# Patient Record
Sex: MALE | Race: BLACK OR AFRICAN AMERICAN | NOT HISPANIC OR LATINO | Employment: OTHER | URBAN - METROPOLITAN AREA
[De-identification: names, ages, dates, MRNs, and addresses within clinical notes are randomized per-mention and may not be internally consistent; named-entity substitution may affect disease eponyms.]

---

## 2020-01-01 ENCOUNTER — HOSPITAL ENCOUNTER (INPATIENT)
Facility: HOSPITAL | Age: 70
LOS: 5 days | DRG: 193 | End: 2020-08-12
Attending: PSYCHIATRY & NEUROLOGY | Admitting: PSYCHIATRY & NEUROLOGY
Payer: MEDICARE

## 2020-01-01 ENCOUNTER — ANESTHESIA EVENT (OUTPATIENT)
Dept: NEUROSURGERY | Facility: HOSPITAL | Age: 70
DRG: 193 | End: 2020-01-01
Payer: MEDICARE

## 2020-01-01 ENCOUNTER — ANESTHESIA (OUTPATIENT)
Dept: NEUROSURGERY | Facility: HOSPITAL | Age: 70
DRG: 193 | End: 2020-01-01
Payer: MEDICARE

## 2020-01-01 VITALS
BODY MASS INDEX: 41.69 KG/M2 | RESPIRATION RATE: 28 BRPM | SYSTOLIC BLOOD PRESSURE: 140 MMHG | OXYGEN SATURATION: 90 % | HEART RATE: 72 BPM | TEMPERATURE: 98 F | DIASTOLIC BLOOD PRESSURE: 85 MMHG | HEIGHT: 69 IN | WEIGHT: 281.5 LBS

## 2020-01-01 DIAGNOSIS — R41.89 UNRESPONSIVE: ICD-10-CM

## 2020-01-01 DIAGNOSIS — I48.20 CHRONIC ATRIAL FIBRILLATION: ICD-10-CM

## 2020-01-01 DIAGNOSIS — J15.9 COMMUNITY ACQUIRED BACTERIAL PNEUMONIA: ICD-10-CM

## 2020-01-01 DIAGNOSIS — G45.9 TIA (TRANSIENT ISCHEMIC ATTACK): ICD-10-CM

## 2020-01-01 DIAGNOSIS — I49.8 BIGEMINY: ICD-10-CM

## 2020-01-01 DIAGNOSIS — R41.82 ALTERED MENTAL STATE: ICD-10-CM

## 2020-01-01 LAB
ABO + RH BLD: NORMAL
ALBUMIN SERPL BCP-MCNC: 1.9 G/DL (ref 3.5–5.2)
ALBUMIN SERPL BCP-MCNC: 2 G/DL (ref 3.5–5.2)
ALBUMIN SERPL BCP-MCNC: 2.2 G/DL (ref 3.5–5.2)
ALBUMIN SERPL BCP-MCNC: 2.5 G/DL (ref 3.5–5.2)
ALBUMIN SERPL BCP-MCNC: 2.6 G/DL (ref 3.5–5.2)
ALLENS TEST: ABNORMAL
ALLENS TEST: ABNORMAL
ALP SERPL-CCNC: 111 U/L (ref 55–135)
ALP SERPL-CCNC: 201 U/L (ref 55–135)
ALP SERPL-CCNC: 89 U/L (ref 55–135)
ALP SERPL-CCNC: 95 U/L (ref 55–135)
ALP SERPL-CCNC: 95 U/L (ref 55–135)
ALT SERPL W/O P-5'-P-CCNC: 181 U/L (ref 10–44)
ALT SERPL W/O P-5'-P-CCNC: 25 U/L (ref 10–44)
ALT SERPL W/O P-5'-P-CCNC: 26 U/L (ref 10–44)
ALT SERPL W/O P-5'-P-CCNC: 29 U/L (ref 10–44)
ALT SERPL W/O P-5'-P-CCNC: 54 U/L (ref 10–44)
AMYLASE SERPL-CCNC: 75 U/L (ref 20–110)
ANION GAP SERPL CALC-SCNC: 11 MMOL/L (ref 8–16)
ANION GAP SERPL CALC-SCNC: 11 MMOL/L (ref 8–16)
ANION GAP SERPL CALC-SCNC: 12 MMOL/L (ref 8–16)
ANION GAP SERPL CALC-SCNC: 13 MMOL/L (ref 8–16)
ANION GAP SERPL CALC-SCNC: 15 MMOL/L (ref 8–16)
ANION GAP SERPL CALC-SCNC: 9 MMOL/L (ref 8–16)
APTT BLDCRRT: 34.2 SEC (ref 21–32)
APTT BLDCRRT: 34.2 SEC (ref 21–32)
APTT BLDCRRT: 34.5 SEC (ref 21–32)
APTT BLDCRRT: 41.2 SEC (ref 21–32)
APTT BLDCRRT: 50.1 SEC (ref 21–32)
ASCENDING AORTA: 2.53 CM
AST SERPL-CCNC: 25 U/L (ref 10–40)
AST SERPL-CCNC: 27 U/L (ref 10–40)
AST SERPL-CCNC: 31 U/L (ref 10–40)
AST SERPL-CCNC: 312 U/L (ref 10–40)
AST SERPL-CCNC: 77 U/L (ref 10–40)
AV INDEX (PROSTH): 0.37
AV MEAN GRADIENT: 7 MMHG
AV PEAK GRADIENT: 12 MMHG
AV VALVE AREA: 1.16 CM2
AV VELOCITY RATIO: 0.4
BACTERIA #/AREA URNS AUTO: ABNORMAL /HPF
BACTERIA #/AREA URNS AUTO: ABNORMAL /HPF
BACTERIA SPEC AEROBE CULT: NORMAL
BACTERIA SPEC AEROBE CULT: NORMAL
BASOPHILS # BLD AUTO: 0.01 K/UL (ref 0–0.2)
BASOPHILS # BLD AUTO: 0.02 K/UL (ref 0–0.2)
BASOPHILS NFR BLD: 0.1 % (ref 0–1.9)
BILIRUB SERPL-MCNC: 0.7 MG/DL (ref 0.1–1)
BILIRUB SERPL-MCNC: 0.7 MG/DL (ref 0.1–1)
BILIRUB SERPL-MCNC: 0.9 MG/DL (ref 0.1–1)
BILIRUB SERPL-MCNC: 0.9 MG/DL (ref 0.1–1)
BILIRUB SERPL-MCNC: 1 MG/DL (ref 0.1–1)
BILIRUB UR QL STRIP: NEGATIVE
BLD GP AB SCN CELLS X3 SERPL QL: NORMAL
BNP SERPL-MCNC: 440 PG/ML (ref 0–99)
BNP SERPL-MCNC: 691 PG/ML (ref 0–99)
BSA FOR ECHO PROCEDURE: 2.43 M2
BUN SERPL-MCNC: 58 MG/DL (ref 8–23)
BUN SERPL-MCNC: 62 MG/DL (ref 8–23)
BUN SERPL-MCNC: 68 MG/DL (ref 8–23)
BUN SERPL-MCNC: 78 MG/DL (ref 8–23)
BUN SERPL-MCNC: 82 MG/DL (ref 8–23)
BUN SERPL-MCNC: 95 MG/DL (ref 8–23)
CALCIUM SERPL-MCNC: 7.9 MG/DL (ref 8.7–10.5)
CALCIUM SERPL-MCNC: 8.2 MG/DL (ref 8.7–10.5)
CALCIUM SERPL-MCNC: 8.5 MG/DL (ref 8.7–10.5)
CALCIUM SERPL-MCNC: 8.7 MG/DL (ref 8.7–10.5)
CALCIUM SERPL-MCNC: 8.7 MG/DL (ref 8.7–10.5)
CALCIUM SERPL-MCNC: 8.8 MG/DL (ref 8.7–10.5)
CHLORIDE SERPL-SCNC: 100 MMOL/L (ref 95–110)
CHLORIDE SERPL-SCNC: 101 MMOL/L (ref 95–110)
CHLORIDE SERPL-SCNC: 101 MMOL/L (ref 95–110)
CHLORIDE SERPL-SCNC: 102 MMOL/L (ref 95–110)
CHLORIDE SERPL-SCNC: 103 MMOL/L (ref 95–110)
CHLORIDE SERPL-SCNC: 99 MMOL/L (ref 95–110)
CHOLEST SERPL-MCNC: 89 MG/DL (ref 120–199)
CHOLEST/HDLC SERPL: 3.6 {RATIO} (ref 2–5)
CLARITY UR REFRACT.AUTO: ABNORMAL
CO2 SERPL-SCNC: 22 MMOL/L (ref 23–29)
CO2 SERPL-SCNC: 25 MMOL/L (ref 23–29)
CO2 SERPL-SCNC: 26 MMOL/L (ref 23–29)
CO2 SERPL-SCNC: 28 MMOL/L (ref 23–29)
COLOR UR AUTO: YELLOW
CREAT SERPL-MCNC: 2.1 MG/DL (ref 0.5–1.4)
CREAT SERPL-MCNC: 2.1 MG/DL (ref 0.5–1.4)
CREAT SERPL-MCNC: 2.4 MG/DL (ref 0.5–1.4)
CREAT SERPL-MCNC: 3.1 MG/DL (ref 0.5–1.4)
CREAT SERPL-MCNC: 3.1 MG/DL (ref 0.5–1.4)
CREAT SERPL-MCNC: 3.4 MG/DL (ref 0.5–1.4)
CREAT UR-MCNC: 87 MG/DL (ref 23–375)
CV ECHO LV RWT: 0.31 CM
D DIMER PPP IA.FEU-MCNC: 0.93 MG/L FEU
DELSYS: ABNORMAL
DELSYS: ABNORMAL
DIFFERENTIAL METHOD: ABNORMAL
DOP CALC AO PEAK VEL: 1.72 M/S
DOP CALC AO VTI: 38.39 CM
DOP CALC LVOT AREA: 3.1 CM2
DOP CALC LVOT DIAMETER: 1.99 CM
DOP CALC LVOT PEAK VEL: 0.68 M/S
DOP CALC LVOT STROKE VOLUME: 44.49 CM3
DOP CALCLVOT PEAK VEL VTI: 14.31 CM
E WAVE DECELERATION TIME: 224.66 MSEC
E/A RATIO: 3.52
E/E' RATIO: 12 M/S
ECHO LV POSTERIOR WALL: 0.8 CM (ref 0.6–1.1)
EOSINOPHIL # BLD AUTO: 0 K/UL (ref 0–0.5)
EOSINOPHIL NFR BLD: 0 % (ref 0–8)
EOSINOPHIL NFR BLD: 0.1 % (ref 0–8)
EOSINOPHIL NFR BLD: 0.1 % (ref 0–8)
ERYTHROCYTE [DISTWIDTH] IN BLOOD BY AUTOMATED COUNT: 14.7 % (ref 11.5–14.5)
ERYTHROCYTE [DISTWIDTH] IN BLOOD BY AUTOMATED COUNT: 15.3 % (ref 11.5–14.5)
ERYTHROCYTE [DISTWIDTH] IN BLOOD BY AUTOMATED COUNT: 15.3 % (ref 11.5–14.5)
ERYTHROCYTE [DISTWIDTH] IN BLOOD BY AUTOMATED COUNT: 15.4 % (ref 11.5–14.5)
EST. GFR  (AFRICAN AMERICAN): 20.1 ML/MIN/1.73 M^2
EST. GFR  (AFRICAN AMERICAN): 22.5 ML/MIN/1.73 M^2
EST. GFR  (AFRICAN AMERICAN): 22.5 ML/MIN/1.73 M^2
EST. GFR  (AFRICAN AMERICAN): 30.7 ML/MIN/1.73 M^2
EST. GFR  (AFRICAN AMERICAN): 36 ML/MIN/1.73 M^2
EST. GFR  (AFRICAN AMERICAN): 36 ML/MIN/1.73 M^2
EST. GFR  (NON AFRICAN AMERICAN): 17.4 ML/MIN/1.73 M^2
EST. GFR  (NON AFRICAN AMERICAN): 19.5 ML/MIN/1.73 M^2
EST. GFR  (NON AFRICAN AMERICAN): 19.5 ML/MIN/1.73 M^2
EST. GFR  (NON AFRICAN AMERICAN): 26.5 ML/MIN/1.73 M^2
EST. GFR  (NON AFRICAN AMERICAN): 31.2 ML/MIN/1.73 M^2
EST. GFR  (NON AFRICAN AMERICAN): 31.2 ML/MIN/1.73 M^2
ESTIMATED AVG GLUCOSE: 180 MG/DL (ref 68–131)
FIO2: 35
FLOW: 2
FRACTIONAL SHORTENING: 35 % (ref 28–44)
GLUCOSE SERPL-MCNC: 102 MG/DL (ref 70–110)
GLUCOSE SERPL-MCNC: 106 MG/DL (ref 70–110)
GLUCOSE SERPL-MCNC: 122 MG/DL (ref 70–110)
GLUCOSE SERPL-MCNC: 195 MG/DL (ref 70–110)
GLUCOSE SERPL-MCNC: 229 MG/DL (ref 70–110)
GLUCOSE SERPL-MCNC: 337 MG/DL (ref 70–110)
GLUCOSE UR QL STRIP: ABNORMAL
GLUCOSE UR QL STRIP: ABNORMAL
GLUCOSE UR QL STRIP: NEGATIVE
GRAM STN SPEC: NORMAL
HAV IGM SERPL QL IA: NEGATIVE
HBA1C MFR BLD HPLC: 7.9 % (ref 4–5.6)
HBV CORE IGM SERPL QL IA: NEGATIVE
HBV SURFACE AG SERPL QL IA: NEGATIVE
HCO3 UR-SCNC: 26.5 MMOL/L (ref 24–28)
HCO3 UR-SCNC: 28.1 MMOL/L (ref 24–28)
HCT VFR BLD AUTO: 28.4 % (ref 40–54)
HCT VFR BLD AUTO: 30.4 % (ref 40–54)
HCT VFR BLD AUTO: 30.5 % (ref 40–54)
HCT VFR BLD AUTO: 32.1 % (ref 40–54)
HCT VFR BLD AUTO: 32.1 % (ref 40–54)
HCT VFR BLD AUTO: 33.5 % (ref 40–54)
HCV AB SERPL QL IA: NEGATIVE
HDLC SERPL-MCNC: 25 MG/DL (ref 40–75)
HDLC SERPL: 28.1 % (ref 20–50)
HGB BLD-MCNC: 10.2 G/DL (ref 14–18)
HGB BLD-MCNC: 10.2 G/DL (ref 14–18)
HGB BLD-MCNC: 10.6 G/DL (ref 14–18)
HGB BLD-MCNC: 9.1 G/DL (ref 14–18)
HGB BLD-MCNC: 9.7 G/DL (ref 14–18)
HGB BLD-MCNC: 9.8 G/DL (ref 14–18)
HGB UR QL STRIP: ABNORMAL
HYALINE CASTS UR QL AUTO: 2 /LPF
HYALINE CASTS UR QL AUTO: 3 /LPF
IMM GRANULOCYTES # BLD AUTO: 0.05 K/UL (ref 0–0.04)
IMM GRANULOCYTES # BLD AUTO: 0.05 K/UL (ref 0–0.04)
IMM GRANULOCYTES # BLD AUTO: 0.08 K/UL (ref 0–0.04)
IMM GRANULOCYTES # BLD AUTO: 0.12 K/UL (ref 0–0.04)
IMM GRANULOCYTES # BLD AUTO: 0.16 K/UL (ref 0–0.04)
IMM GRANULOCYTES # BLD AUTO: 0.73 K/UL (ref 0–0.04)
IMM GRANULOCYTES NFR BLD AUTO: 0.4 % (ref 0–0.5)
IMM GRANULOCYTES NFR BLD AUTO: 0.4 % (ref 0–0.5)
IMM GRANULOCYTES NFR BLD AUTO: 0.6 % (ref 0–0.5)
IMM GRANULOCYTES NFR BLD AUTO: 0.6 % (ref 0–0.5)
IMM GRANULOCYTES NFR BLD AUTO: 0.8 % (ref 0–0.5)
IMM GRANULOCYTES NFR BLD AUTO: 3.2 % (ref 0–0.5)
INR PPP: 1.1 (ref 0.8–1.2)
INTERVENTRICULAR SEPTUM: 0.9 CM (ref 0.6–1.1)
KETONES UR QL STRIP: NEGATIVE
LA MAJOR: 6.99 CM
LA MINOR: 5.57 CM
LA WIDTH: 4.93 CM
LDLC SERPL CALC-MCNC: 50.6 MG/DL (ref 63–159)
LEFT ATRIUM SIZE: 4.42 CM
LEFT ATRIUM VOLUME INDEX: 49.1 ML/M2
LEFT ATRIUM VOLUME: 114.83 CM3
LEFT INTERNAL DIMENSION IN SYSTOLE: 3.39 CM (ref 2.1–4)
LEFT VENTRICLE DIASTOLIC VOLUME INDEX: 41.39 ML/M2
LEFT VENTRICLE DIASTOLIC VOLUME: 96.81 ML
LEFT VENTRICLE MASS INDEX: 67 G/M2
LEFT VENTRICLE SYSTOLIC VOLUME INDEX: 20.2 ML/M2
LEFT VENTRICLE SYSTOLIC VOLUME: 47.2 ML
LEFT VENTRICULAR INTERNAL DIMENSION IN DIASTOLE: 5.2 CM (ref 3.5–6)
LEFT VENTRICULAR MASS: 156.93 G
LEUKOCYTE ESTERASE UR QL STRIP: NEGATIVE
LIPASE SERPL-CCNC: 15 U/L (ref 4–60)
LV LATERAL E/E' RATIO: 9.27 M/S
LV SEPTAL E/E' RATIO: 17 M/S
LYMPHOCYTES # BLD AUTO: 0.3 K/UL (ref 1–4.8)
LYMPHOCYTES # BLD AUTO: 0.6 K/UL (ref 1–4.8)
LYMPHOCYTES # BLD AUTO: 0.7 K/UL (ref 1–4.8)
LYMPHOCYTES # BLD AUTO: 0.7 K/UL (ref 1–4.8)
LYMPHOCYTES NFR BLD: 2.3 % (ref 18–48)
LYMPHOCYTES NFR BLD: 2.9 % (ref 18–48)
LYMPHOCYTES NFR BLD: 2.9 % (ref 18–48)
LYMPHOCYTES NFR BLD: 3 % (ref 18–48)
LYMPHOCYTES NFR BLD: 3.2 % (ref 18–48)
LYMPHOCYTES NFR BLD: 3.7 % (ref 18–48)
MAGNESIUM SERPL-MCNC: 2.5 MG/DL (ref 1.6–2.6)
MAGNESIUM SERPL-MCNC: 2.8 MG/DL (ref 1.6–2.6)
MAGNESIUM SERPL-MCNC: 2.8 MG/DL (ref 1.6–2.6)
MAGNESIUM SERPL-MCNC: 3 MG/DL (ref 1.6–2.6)
MAGNESIUM SERPL-MCNC: 3.6 MG/DL (ref 1.6–2.6)
MCH RBC QN AUTO: 26.4 PG (ref 27–31)
MCH RBC QN AUTO: 26.8 PG (ref 27–31)
MCH RBC QN AUTO: 26.8 PG (ref 27–31)
MCH RBC QN AUTO: 26.9 PG (ref 27–31)
MCH RBC QN AUTO: 27 PG (ref 27–31)
MCH RBC QN AUTO: 27 PG (ref 27–31)
MCHC RBC AUTO-ENTMCNC: 31.6 G/DL (ref 32–36)
MCHC RBC AUTO-ENTMCNC: 31.8 G/DL (ref 32–36)
MCHC RBC AUTO-ENTMCNC: 32 G/DL (ref 32–36)
MCHC RBC AUTO-ENTMCNC: 32.2 G/DL (ref 32–36)
MCV RBC AUTO: 83 FL (ref 82–98)
MCV RBC AUTO: 84 FL (ref 82–98)
MCV RBC AUTO: 84 FL (ref 82–98)
MCV RBC AUTO: 85 FL (ref 82–98)
MICROSCOPIC COMMENT: ABNORMAL
MICROSCOPIC COMMENT: ABNORMAL
MIN VOL: 11.5
MODE: ABNORMAL
MODE: ABNORMAL
MONOCYTES # BLD AUTO: 0.2 K/UL (ref 0.3–1)
MONOCYTES # BLD AUTO: 0.3 K/UL (ref 0.3–1)
MONOCYTES # BLD AUTO: 0.3 K/UL (ref 0.3–1)
MONOCYTES # BLD AUTO: 1.6 K/UL (ref 0.3–1)
MONOCYTES # BLD AUTO: 1.6 K/UL (ref 0.3–1)
MONOCYTES # BLD AUTO: 1.7 K/UL (ref 0.3–1)
MONOCYTES NFR BLD: 1.5 % (ref 4–15)
MONOCYTES NFR BLD: 2.2 % (ref 4–15)
MONOCYTES NFR BLD: 2.2 % (ref 4–15)
MONOCYTES NFR BLD: 7.5 % (ref 4–15)
MONOCYTES NFR BLD: 8.1 % (ref 4–15)
MONOCYTES NFR BLD: 8.3 % (ref 4–15)
MV PEAK A VEL: 0.29 M/S
MV PEAK E VEL: 1.02 M/S
MV STENOSIS PRESSURE HALF TIME: 65.15 MS
MV VALVE AREA P 1/2 METHOD: 3.38 CM2
NEUTROPHILS # BLD AUTO: 11.1 K/UL (ref 1.8–7.7)
NEUTROPHILS # BLD AUTO: 11.1 K/UL (ref 1.8–7.7)
NEUTROPHILS # BLD AUTO: 13.8 K/UL (ref 1.8–7.7)
NEUTROPHILS # BLD AUTO: 17.1 K/UL (ref 1.8–7.7)
NEUTROPHILS # BLD AUTO: 17.3 K/UL (ref 1.8–7.7)
NEUTROPHILS # BLD AUTO: 19.5 K/UL (ref 1.8–7.7)
NEUTROPHILS NFR BLD: 86 % (ref 38–73)
NEUTROPHILS NFR BLD: 87.2 % (ref 38–73)
NEUTROPHILS NFR BLD: 88 % (ref 38–73)
NEUTROPHILS NFR BLD: 94.4 % (ref 38–73)
NEUTROPHILS NFR BLD: 94.4 % (ref 38–73)
NEUTROPHILS NFR BLD: 95.4 % (ref 38–73)
NITRITE UR QL STRIP: NEGATIVE
NONHDLC SERPL-MCNC: 64 MG/DL
NRBC BLD-RTO: 0 /100 WBC
PCO2 BLDA: 42.2 MMHG (ref 35–45)
PCO2 BLDA: 42.9 MMHG (ref 35–45)
PEEP: 10
PH SMN: 7.4 [PH] (ref 7.35–7.45)
PH SMN: 7.43 [PH] (ref 7.35–7.45)
PH UR STRIP: 5 [PH] (ref 5–8)
PHOSPHATE SERPL-MCNC: 3.3 MG/DL (ref 2.7–4.5)
PHOSPHATE SERPL-MCNC: 3.9 MG/DL (ref 2.7–4.5)
PHOSPHATE SERPL-MCNC: 4.4 MG/DL (ref 2.7–4.5)
PHOSPHATE SERPL-MCNC: 4.6 MG/DL (ref 2.7–4.5)
PHOSPHATE SERPL-MCNC: 5.9 MG/DL (ref 2.7–4.5)
PISA TR MAX VEL: 4 M/S
PLATELET # BLD AUTO: 152 K/UL (ref 150–350)
PLATELET # BLD AUTO: 152 K/UL (ref 150–350)
PLATELET # BLD AUTO: 182 K/UL (ref 150–350)
PLATELET # BLD AUTO: 187 K/UL (ref 150–350)
PLATELET # BLD AUTO: 191 K/UL (ref 150–350)
PLATELET # BLD AUTO: 200 K/UL (ref 150–350)
PMV BLD AUTO: 10.8 FL (ref 9.2–12.9)
PMV BLD AUTO: 10.9 FL (ref 9.2–12.9)
PMV BLD AUTO: 10.9 FL (ref 9.2–12.9)
PMV BLD AUTO: 11 FL (ref 9.2–12.9)
PMV BLD AUTO: 11.3 FL (ref 9.2–12.9)
PMV BLD AUTO: 11.3 FL (ref 9.2–12.9)
PO2 BLDA: 68 MMHG (ref 40–60)
PO2 BLDA: 74 MMHG (ref 80–100)
POC BE: 2 MMOL/L
POC BE: 4 MMOL/L
POC SATURATED O2: 93 % (ref 95–100)
POC SATURATED O2: 95 % (ref 95–100)
POC TCO2: 28 MMOL/L (ref 24–29)
POC TCO2: 29 MMOL/L (ref 23–27)
POCT GLUCOSE: 103 MG/DL (ref 70–110)
POCT GLUCOSE: 126 MG/DL (ref 70–110)
POCT GLUCOSE: 140 MG/DL (ref 70–110)
POCT GLUCOSE: 147 MG/DL (ref 70–110)
POCT GLUCOSE: 149 MG/DL (ref 70–110)
POCT GLUCOSE: 155 MG/DL (ref 70–110)
POCT GLUCOSE: 174 MG/DL (ref 70–110)
POCT GLUCOSE: 210 MG/DL (ref 70–110)
POCT GLUCOSE: 213 MG/DL (ref 70–110)
POCT GLUCOSE: 220 MG/DL (ref 70–110)
POCT GLUCOSE: 224 MG/DL (ref 70–110)
POCT GLUCOSE: 244 MG/DL (ref 70–110)
POCT GLUCOSE: 266 MG/DL (ref 70–110)
POCT GLUCOSE: 332 MG/DL (ref 70–110)
POCT GLUCOSE: 370 MG/DL (ref 70–110)
POCT GLUCOSE: 396 MG/DL (ref 70–110)
POCT GLUCOSE: 87 MG/DL (ref 70–110)
POTASSIUM SERPL-SCNC: 3.8 MMOL/L (ref 3.5–5.1)
POTASSIUM SERPL-SCNC: 4.1 MMOL/L (ref 3.5–5.1)
POTASSIUM SERPL-SCNC: 4.2 MMOL/L (ref 3.5–5.1)
POTASSIUM SERPL-SCNC: 4.6 MMOL/L (ref 3.5–5.1)
POTASSIUM SERPL-SCNC: 4.6 MMOL/L (ref 3.5–5.1)
POTASSIUM SERPL-SCNC: 4.9 MMOL/L (ref 3.5–5.1)
PROT SERPL-MCNC: 6.4 G/DL (ref 6–8.4)
PROT SERPL-MCNC: 6.5 G/DL (ref 6–8.4)
PROT SERPL-MCNC: 6.6 G/DL (ref 6–8.4)
PROT SERPL-MCNC: 6.9 G/DL (ref 6–8.4)
PROT SERPL-MCNC: 7.1 G/DL (ref 6–8.4)
PROT UR QL STRIP: ABNORMAL
PROT UR-MCNC: 77 MG/DL (ref 0–15)
PROT/CREAT UR: 0.89 MG/G{CREAT} (ref 0–0.2)
PROTHROMBIN TIME: 12.2 SEC (ref 9–12.5)
RA MAJOR: 7.33 CM
RA WIDTH: 3.64 CM
RBC # BLD AUTO: 3.39 M/UL (ref 4.6–6.2)
RBC # BLD AUTO: 3.64 M/UL (ref 4.6–6.2)
RBC # BLD AUTO: 3.67 M/UL (ref 4.6–6.2)
RBC # BLD AUTO: 3.78 M/UL (ref 4.6–6.2)
RBC # BLD AUTO: 3.78 M/UL (ref 4.6–6.2)
RBC # BLD AUTO: 3.95 M/UL (ref 4.6–6.2)
RBC #/AREA URNS AUTO: >100 /HPF (ref 0–4)
RBC #/AREA URNS AUTO: >100 /HPF (ref 0–4)
RIGHT VENTRICULAR END-DIASTOLIC DIMENSION: 3.84 CM
SAMPLE: ABNORMAL
SAMPLE: ABNORMAL
SARS-COV-2 RDRP RESP QL NAA+PROBE: NEGATIVE
SINUS: 2.35 CM
SITE: ABNORMAL
SITE: ABNORMAL
SODIUM SERPL-SCNC: 137 MMOL/L (ref 136–145)
SODIUM SERPL-SCNC: 137 MMOL/L (ref 136–145)
SODIUM SERPL-SCNC: 138 MMOL/L (ref 136–145)
SODIUM SERPL-SCNC: 139 MMOL/L (ref 136–145)
SODIUM SERPL-SCNC: 139 MMOL/L (ref 136–145)
SODIUM SERPL-SCNC: 140 MMOL/L (ref 136–145)
SP GR UR STRIP: 1.02 (ref 1–1.03)
SP02: 93
SP02: 97
SPONT RATE: 16
SQUAMOUS #/AREA URNS AUTO: 1 /HPF
STJ: 2.82 CM
T4 FREE SERPL-MCNC: 1.11 NG/DL (ref 0.71–1.51)
TDI LATERAL: 0.11 M/S
TDI SEPTAL: 0.06 M/S
TDI: 0.09 M/S
TR MAX PG: 64 MMHG
TRIGL SERPL-MCNC: 67 MG/DL (ref 30–150)
TROPONIN I SERPL DL<=0.01 NG/ML-MCNC: 0.06 NG/ML (ref 0–0.03)
TROPONIN I SERPL DL<=0.01 NG/ML-MCNC: 0.07 NG/ML (ref 0–0.03)
TROPONIN I SERPL DL<=0.01 NG/ML-MCNC: 0.08 NG/ML (ref 0–0.03)
TSH SERPL DL<=0.005 MIU/L-ACNC: 0.37 UIU/ML (ref 0.4–4)
URN SPEC COLLECT METH UR: ABNORMAL
UUN UR-MCNC: 497 MG/DL (ref 140–1050)
VANCOMYCIN SERPL-MCNC: 13 UG/ML
VANCOMYCIN SERPL-MCNC: 19.1 UG/ML
VANCOMYCIN SERPL-MCNC: 26.7 UG/ML
VANCOMYCIN TROUGH SERPL-MCNC: 22.2 UG/ML (ref 10–22)
VIT B12 SERPL-MCNC: >2000 PG/ML (ref 210–950)
WBC # BLD AUTO: 11.76 K/UL (ref 3.9–12.7)
WBC # BLD AUTO: 11.76 K/UL (ref 3.9–12.7)
WBC # BLD AUTO: 14.43 K/UL (ref 3.9–12.7)
WBC # BLD AUTO: 19.45 K/UL (ref 3.9–12.7)
WBC # BLD AUTO: 19.8 K/UL (ref 3.9–12.7)
WBC # BLD AUTO: 22.71 K/UL (ref 3.9–12.7)
WBC #/AREA URNS AUTO: 1 /HPF (ref 0–5)
WBC #/AREA URNS AUTO: 7 /HPF (ref 0–5)
YEAST UR QL AUTO: ABNORMAL

## 2020-01-01 PROCEDURE — 25000242 PHARM REV CODE 250 ALT 637 W/ HCPCS: Performed by: NURSE PRACTITIONER

## 2020-01-01 PROCEDURE — 80074 ACUTE HEPATITIS PANEL: CPT

## 2020-01-01 PROCEDURE — 84100 ASSAY OF PHOSPHORUS: CPT

## 2020-01-01 PROCEDURE — 11000001 HC ACUTE MED/SURG PRIVATE ROOM

## 2020-01-01 PROCEDURE — 63600175 PHARM REV CODE 636 W HCPCS: Performed by: STUDENT IN AN ORGANIZED HEALTH CARE EDUCATION/TRAINING PROGRAM

## 2020-01-01 PROCEDURE — 85025 COMPLETE CBC W/AUTO DIFF WBC: CPT

## 2020-01-01 PROCEDURE — 86901 BLOOD TYPING SEROLOGIC RH(D): CPT

## 2020-01-01 PROCEDURE — 85379 FIBRIN DEGRADATION QUANT: CPT

## 2020-01-01 PROCEDURE — 31500 INSERT EMERGENCY AIRWAY: CPT | Performed by: STUDENT IN AN ORGANIZED HEALTH CARE EDUCATION/TRAINING PROGRAM

## 2020-01-01 PROCEDURE — 94640 AIRWAY INHALATION TREATMENT: CPT

## 2020-01-01 PROCEDURE — 87070 CULTURE OTHR SPECIMN AEROBIC: CPT

## 2020-01-01 PROCEDURE — 83735 ASSAY OF MAGNESIUM: CPT

## 2020-01-01 PROCEDURE — 99222 1ST HOSP IP/OBS MODERATE 55: CPT | Mod: AI,GC,, | Performed by: PSYCHIATRY & NEUROLOGY

## 2020-01-01 PROCEDURE — 92610 EVALUATE SWALLOWING FUNCTION: CPT

## 2020-01-01 PROCEDURE — 85730 THROMBOPLASTIN TIME PARTIAL: CPT

## 2020-01-01 PROCEDURE — 82803 BLOOD GASES ANY COMBINATION: CPT

## 2020-01-01 PROCEDURE — 85610 PROTHROMBIN TIME: CPT

## 2020-01-01 PROCEDURE — 99900035 HC TECH TIME PER 15 MIN (STAT)

## 2020-01-01 PROCEDURE — 83690 ASSAY OF LIPASE: CPT

## 2020-01-01 PROCEDURE — 99222 PR INITIAL HOSPITAL CARE,LEVL II: ICD-10-PCS | Mod: ,,, | Performed by: NURSE PRACTITIONER

## 2020-01-01 PROCEDURE — 99232 PR SUBSEQUENT HOSPITAL CARE,LEVL II: ICD-10-PCS | Mod: ,,, | Performed by: NURSE PRACTITIONER

## 2020-01-01 PROCEDURE — 25000003 PHARM REV CODE 250: Performed by: STUDENT IN AN ORGANIZED HEALTH CARE EDUCATION/TRAINING PROGRAM

## 2020-01-01 PROCEDURE — 25000003 PHARM REV CODE 250: Performed by: INTERNAL MEDICINE

## 2020-01-01 PROCEDURE — 25000003 PHARM REV CODE 250: Performed by: PSYCHIATRY & NEUROLOGY

## 2020-01-01 PROCEDURE — 63700000 PHARM REV CODE 250 ALT 637 W/O HCPCS: Performed by: STUDENT IN AN ORGANIZED HEALTH CARE EDUCATION/TRAINING PROGRAM

## 2020-01-01 PROCEDURE — 80061 LIPID PANEL: CPT

## 2020-01-01 PROCEDURE — 36415 COLL VENOUS BLD VENIPUNCTURE: CPT

## 2020-01-01 PROCEDURE — 84439 ASSAY OF FREE THYROXINE: CPT

## 2020-01-01 PROCEDURE — 84540 ASSAY OF URINE/UREA-N: CPT

## 2020-01-01 PROCEDURE — 83036 HEMOGLOBIN GLYCOSYLATED A1C: CPT

## 2020-01-01 PROCEDURE — 99233 SBSQ HOSP IP/OBS HIGH 50: CPT | Mod: GC,,, | Performed by: PSYCHIATRY & NEUROLOGY

## 2020-01-01 PROCEDURE — 99233 PR SUBSEQUENT HOSPITAL CARE,LEVL III: ICD-10-PCS | Mod: ,,, | Performed by: PSYCHIATRY & NEUROLOGY

## 2020-01-01 PROCEDURE — 80048 BASIC METABOLIC PNL TOTAL CA: CPT

## 2020-01-01 PROCEDURE — 80053 COMPREHEN METABOLIC PANEL: CPT

## 2020-01-01 PROCEDURE — 25000003 PHARM REV CODE 250: Performed by: UROLOGY

## 2020-01-01 PROCEDURE — 63600175 PHARM REV CODE 636 W HCPCS: Performed by: PSYCHIATRY & NEUROLOGY

## 2020-01-01 PROCEDURE — 20000000 HC ICU ROOM

## 2020-01-01 PROCEDURE — 95813 EEG EXTND MNTR 61-119 MIN: CPT

## 2020-01-01 PROCEDURE — 94761 N-INVAS EAR/PLS OXIMETRY MLT: CPT

## 2020-01-01 PROCEDURE — 63600175 PHARM REV CODE 636 W HCPCS: Performed by: UROLOGY

## 2020-01-01 PROCEDURE — 99223 1ST HOSP IP/OBS HIGH 75: CPT | Mod: ,,, | Performed by: HOSPITALIST

## 2020-01-01 PROCEDURE — 97530 THERAPEUTIC ACTIVITIES: CPT

## 2020-01-01 PROCEDURE — U0002 COVID-19 LAB TEST NON-CDC: HCPCS

## 2020-01-01 PROCEDURE — 27000221 HC OXYGEN, UP TO 24 HOURS

## 2020-01-01 PROCEDURE — 84484 ASSAY OF TROPONIN QUANT: CPT | Mod: 91

## 2020-01-01 PROCEDURE — 92523 SPEECH SOUND LANG COMPREHEN: CPT

## 2020-01-01 PROCEDURE — 25000003 PHARM REV CODE 250: Performed by: NURSE PRACTITIONER

## 2020-01-01 PROCEDURE — 25000242 PHARM REV CODE 250 ALT 637 W/ HCPCS: Performed by: STUDENT IN AN ORGANIZED HEALTH CARE EDUCATION/TRAINING PROGRAM

## 2020-01-01 PROCEDURE — 93005 ELECTROCARDIOGRAM TRACING: CPT

## 2020-01-01 PROCEDURE — 94660 CPAP INITIATION&MGMT: CPT

## 2020-01-01 PROCEDURE — 87449 NOS EACH ORGANISM AG IA: CPT

## 2020-01-01 PROCEDURE — 99232 SBSQ HOSP IP/OBS MODERATE 35: CPT | Mod: ,,, | Performed by: NURSE PRACTITIONER

## 2020-01-01 PROCEDURE — 99223 PR INITIAL HOSPITAL CARE,LEVL III: ICD-10-PCS | Mod: ,,, | Performed by: HOSPITALIST

## 2020-01-01 PROCEDURE — 85730 THROMBOPLASTIN TIME PARTIAL: CPT | Mod: 91

## 2020-01-01 PROCEDURE — 95816 PR EEG,W/AWAKE & DROWSY RECORD: ICD-10-PCS | Mod: 26,,, | Performed by: PSYCHIATRY & NEUROLOGY

## 2020-01-01 PROCEDURE — 82570 ASSAY OF URINE CREATININE: CPT

## 2020-01-01 PROCEDURE — 99238 PR HOSPITAL DISCHARGE DAY,<30 MIN: ICD-10-PCS | Mod: ,,, | Performed by: HOSPITALIST

## 2020-01-01 PROCEDURE — 27000190 HC CPAP FULL FACE MASK W/VALVE

## 2020-01-01 PROCEDURE — 81001 URINALYSIS AUTO W/SCOPE: CPT

## 2020-01-01 PROCEDURE — 63600175 PHARM REV CODE 636 W HCPCS: Performed by: HOSPITALIST

## 2020-01-01 PROCEDURE — 80202 ASSAY OF VANCOMYCIN: CPT

## 2020-01-01 PROCEDURE — 87205 SMEAR GRAM STAIN: CPT

## 2020-01-01 PROCEDURE — 99233 PR SUBSEQUENT HOSPITAL CARE,LEVL III: ICD-10-PCS | Mod: ,,, | Performed by: HOSPITALIST

## 2020-01-01 PROCEDURE — 99222 1ST HOSP IP/OBS MODERATE 55: CPT | Mod: ,,, | Performed by: NURSE PRACTITIONER

## 2020-01-01 PROCEDURE — 82150 ASSAY OF AMYLASE: CPT

## 2020-01-01 PROCEDURE — 99222 PR INITIAL HOSPITAL CARE,LEVL II: ICD-10-PCS | Mod: AI,GC,, | Performed by: PSYCHIATRY & NEUROLOGY

## 2020-01-01 PROCEDURE — 84484 ASSAY OF TROPONIN QUANT: CPT

## 2020-01-01 PROCEDURE — C9399 UNCLASSIFIED DRUGS OR BIOLOG: HCPCS | Performed by: UROLOGY

## 2020-01-01 PROCEDURE — 92950 HEART/LUNG RESUSCITATION CPR: CPT

## 2020-01-01 PROCEDURE — 99233 PR SUBSEQUENT HOSPITAL CARE,LEVL III: ICD-10-PCS | Mod: GC,,, | Performed by: PSYCHIATRY & NEUROLOGY

## 2020-01-01 PROCEDURE — 97802 MEDICAL NUTRITION INDIV IN: CPT

## 2020-01-01 PROCEDURE — 99233 SBSQ HOSP IP/OBS HIGH 50: CPT | Mod: ,,, | Performed by: PSYCHIATRY & NEUROLOGY

## 2020-01-01 PROCEDURE — 31500 INSERT EMERGENCY AIRWAY: CPT

## 2020-01-01 PROCEDURE — 63600175 PHARM REV CODE 636 W HCPCS: Performed by: NURSE PRACTITIONER

## 2020-01-01 PROCEDURE — 93010 ELECTROCARDIOGRAM REPORT: CPT | Mod: ,,, | Performed by: INTERNAL MEDICINE

## 2020-01-01 PROCEDURE — 30200315 PPD INTRADERMAL TEST REV CODE 302: Performed by: STUDENT IN AN ORGANIZED HEALTH CARE EDUCATION/TRAINING PROGRAM

## 2020-01-01 PROCEDURE — 86580 TB INTRADERMAL TEST: CPT | Performed by: STUDENT IN AN ORGANIZED HEALTH CARE EDUCATION/TRAINING PROGRAM

## 2020-01-01 PROCEDURE — 99233 SBSQ HOSP IP/OBS HIGH 50: CPT | Mod: ,,, | Performed by: HOSPITALIST

## 2020-01-01 PROCEDURE — 99238 HOSP IP/OBS DSCHRG MGMT 30/<: CPT | Mod: ,,, | Performed by: HOSPITALIST

## 2020-01-01 PROCEDURE — 97161 PT EVAL LOW COMPLEX 20 MIN: CPT

## 2020-01-01 PROCEDURE — 87040 BLOOD CULTURE FOR BACTERIA: CPT

## 2020-01-01 PROCEDURE — 36600 WITHDRAWAL OF ARTERIAL BLOOD: CPT

## 2020-01-01 PROCEDURE — 97165 OT EVAL LOW COMPLEX 30 MIN: CPT

## 2020-01-01 PROCEDURE — 63600175 PHARM REV CODE 636 W HCPCS: Performed by: INTERNAL MEDICINE

## 2020-01-01 PROCEDURE — 93010 EKG 12-LEAD: ICD-10-PCS | Mod: ,,, | Performed by: INTERNAL MEDICINE

## 2020-01-01 PROCEDURE — 82607 VITAMIN B-12: CPT

## 2020-01-01 PROCEDURE — 83880 ASSAY OF NATRIURETIC PEPTIDE: CPT

## 2020-01-01 PROCEDURE — 84443 ASSAY THYROID STIM HORMONE: CPT

## 2020-01-01 PROCEDURE — 97116 GAIT TRAINING THERAPY: CPT

## 2020-01-01 PROCEDURE — 92526 ORAL FUNCTION THERAPY: CPT

## 2020-01-01 PROCEDURE — 95816 EEG AWAKE AND DROWSY: CPT | Mod: 26,,, | Performed by: PSYCHIATRY & NEUROLOGY

## 2020-01-01 RX ORDER — AMOXICILLIN 250 MG
1 CAPSULE ORAL DAILY PRN
Status: DISCONTINUED | OUTPATIENT
Start: 2020-01-01 | End: 2020-01-01

## 2020-01-01 RX ORDER — GLUCAGON 1 MG
1 KIT INJECTION
Status: DISCONTINUED | OUTPATIENT
Start: 2020-01-01 | End: 2020-01-01

## 2020-01-01 RX ORDER — POLYETHYLENE GLYCOL 3350 17 G/17G
17 POWDER, FOR SOLUTION ORAL 3 TIMES DAILY
Status: DISCONTINUED | OUTPATIENT
Start: 2020-01-01 | End: 2020-01-01 | Stop reason: HOSPADM

## 2020-01-01 RX ORDER — AMLODIPINE BESYLATE 10 MG/1
10 TABLET ORAL DAILY
Status: DISCONTINUED | OUTPATIENT
Start: 2020-01-01 | End: 2020-01-01

## 2020-01-01 RX ORDER — ATORVASTATIN CALCIUM 20 MG/1
80 TABLET, FILM COATED ORAL DAILY
Status: DISCONTINUED | OUTPATIENT
Start: 2020-01-01 | End: 2020-01-01 | Stop reason: HOSPADM

## 2020-01-01 RX ORDER — LISINOPRIL 40 MG/1
40 TABLET ORAL DAILY
COMMUNITY

## 2020-01-01 RX ORDER — TRAMADOL HYDROCHLORIDE 50 MG/1
50 TABLET ORAL EVERY 8 HOURS PRN
COMMUNITY

## 2020-01-01 RX ORDER — INSULIN GLARGINE 100 [IU]/ML
30 INJECTION, SOLUTION SUBCUTANEOUS EVERY MORNING
COMMUNITY

## 2020-01-01 RX ORDER — HYDRALAZINE HYDROCHLORIDE 50 MG/1
100 TABLET, FILM COATED ORAL EVERY 8 HOURS
Status: DISCONTINUED | OUTPATIENT
Start: 2020-01-01 | End: 2020-01-01 | Stop reason: HOSPADM

## 2020-01-01 RX ORDER — CARVEDILOL 25 MG/1
25 TABLET ORAL 2 TIMES DAILY
COMMUNITY

## 2020-01-01 RX ORDER — POTASSIUM CHLORIDE 1.5 G/1.58G
40 POWDER, FOR SOLUTION ORAL
Status: DISCONTINUED | OUTPATIENT
Start: 2020-01-01 | End: 2020-01-01

## 2020-01-01 RX ORDER — FUROSEMIDE 10 MG/ML
20 INJECTION INTRAMUSCULAR; INTRAVENOUS ONCE
Status: COMPLETED | OUTPATIENT
Start: 2020-01-01 | End: 2020-01-01

## 2020-01-01 RX ORDER — ALLOPURINOL 300 MG/1
300 TABLET ORAL DAILY
COMMUNITY

## 2020-01-01 RX ORDER — GLUCAGON 1 MG
1 KIT INJECTION
Status: DISCONTINUED | OUTPATIENT
Start: 2020-01-01 | End: 2020-01-01 | Stop reason: HOSPADM

## 2020-01-01 RX ORDER — LATANOPROST 50 UG/ML
1 SOLUTION/ DROPS OPHTHALMIC NIGHTLY
COMMUNITY

## 2020-01-01 RX ORDER — INSULIN ASPART 100 [IU]/ML
1-10 INJECTION, SOLUTION INTRAVENOUS; SUBCUTANEOUS EVERY 6 HOURS PRN
Status: DISCONTINUED | OUTPATIENT
Start: 2020-01-01 | End: 2020-01-01

## 2020-01-01 RX ORDER — HYDROCHLOROTHIAZIDE 50 MG/1
50 TABLET ORAL EVERY MORNING
COMMUNITY

## 2020-01-01 RX ORDER — HEPARIN SODIUM,PORCINE/D5W 25000/250
12 INTRAVENOUS SOLUTION INTRAVENOUS CONTINUOUS
Status: DISCONTINUED | OUTPATIENT
Start: 2020-01-01 | End: 2020-01-01

## 2020-01-01 RX ORDER — SILODOSIN 8 MG/1
8 CAPSULE ORAL DAILY
Qty: 30 CAPSULE | Refills: 11
Start: 2020-01-01 | End: 2021-08-09

## 2020-01-01 RX ORDER — ATORVASTATIN CALCIUM 20 MG/1
40 TABLET, FILM COATED ORAL DAILY
Status: DISCONTINUED | OUTPATIENT
Start: 2020-01-01 | End: 2020-01-01

## 2020-01-01 RX ORDER — TAMSULOSIN HYDROCHLORIDE 0.4 MG/1
0.4 CAPSULE ORAL DAILY
COMMUNITY

## 2020-01-01 RX ORDER — IPRATROPIUM BROMIDE AND ALBUTEROL SULFATE 2.5; .5 MG/3ML; MG/3ML
3 SOLUTION RESPIRATORY (INHALATION) EVERY 4 HOURS PRN
Status: DISCONTINUED | OUTPATIENT
Start: 2020-01-01 | End: 2020-01-01 | Stop reason: HOSPADM

## 2020-01-01 RX ORDER — AMLODIPINE BESYLATE 10 MG/1
10 TABLET ORAL DAILY
COMMUNITY

## 2020-01-01 RX ORDER — HYDRALAZINE HYDROCHLORIDE 25 MG/1
25 TABLET, FILM COATED ORAL EVERY 8 HOURS
Status: DISCONTINUED | OUTPATIENT
Start: 2020-01-01 | End: 2020-01-01

## 2020-01-01 RX ORDER — IBUPROFEN 200 MG
24 TABLET ORAL
Status: DISCONTINUED | OUTPATIENT
Start: 2020-01-01 | End: 2020-01-01 | Stop reason: HOSPADM

## 2020-01-01 RX ORDER — CARVEDILOL 25 MG/1
25 TABLET ORAL 2 TIMES DAILY
Status: DISCONTINUED | OUTPATIENT
Start: 2020-01-01 | End: 2020-01-01 | Stop reason: HOSPADM

## 2020-01-01 RX ORDER — TOPIRAMATE 50 MG/1
50 TABLET, FILM COATED ORAL 2 TIMES DAILY
COMMUNITY

## 2020-01-01 RX ORDER — LANOLIN ALCOHOL/MO/W.PET/CERES
100 CREAM (GRAM) TOPICAL DAILY
COMMUNITY

## 2020-01-01 RX ORDER — HYDRALAZINE HYDROCHLORIDE 25 MG/1
25 TABLET, FILM COATED ORAL EVERY 8 HOURS
Qty: 90 TABLET | Refills: 11
Start: 2020-01-01 | End: 2021-08-08

## 2020-01-01 RX ORDER — AMIODARONE HYDROCHLORIDE 100 MG/1
200 TABLET ORAL DAILY
Status: DISCONTINUED | OUTPATIENT
Start: 2020-01-01 | End: 2020-01-01 | Stop reason: HOSPADM

## 2020-01-01 RX ORDER — INSULIN ASPART 100 [IU]/ML
INJECTION, SOLUTION INTRAVENOUS; SUBCUTANEOUS
COMMUNITY

## 2020-01-01 RX ORDER — POTASSIUM CHLORIDE 1.5 G/1.58G
60 POWDER, FOR SOLUTION ORAL
Status: DISCONTINUED | OUTPATIENT
Start: 2020-01-01 | End: 2020-01-01

## 2020-01-01 RX ORDER — INSULIN ASPART 100 [IU]/ML
5 INJECTION, SOLUTION INTRAVENOUS; SUBCUTANEOUS
Status: DISCONTINUED | OUTPATIENT
Start: 2020-01-01 | End: 2020-01-01 | Stop reason: HOSPADM

## 2020-01-01 RX ORDER — FAMOTIDINE 20 MG/1
20 TABLET, FILM COATED ORAL 2 TIMES DAILY
Qty: 60 TABLET | Refills: 0
Start: 2020-01-01 | End: 2020-09-07

## 2020-01-01 RX ORDER — IPRATROPIUM BROMIDE AND ALBUTEROL SULFATE 2.5; .5 MG/3ML; MG/3ML
3 SOLUTION RESPIRATORY (INHALATION) EVERY 4 HOURS
Status: DISCONTINUED | OUTPATIENT
Start: 2020-01-01 | End: 2020-01-01 | Stop reason: HOSPADM

## 2020-01-01 RX ORDER — METHYLCELLULOSE
POWDER (GRAM) ORAL DAILY
COMMUNITY

## 2020-01-01 RX ORDER — DOCUSATE CALCIUM 240 MG
480 CAPSULE ORAL 2 TIMES DAILY
COMMUNITY

## 2020-01-01 RX ORDER — INSULIN ASPART 100 [IU]/ML
10 INJECTION, SOLUTION INTRAVENOUS; SUBCUTANEOUS 3 TIMES DAILY
Qty: 9 ML | Refills: 1
Start: 2020-01-01 | End: 2021-08-08

## 2020-01-01 RX ORDER — ATORVASTATIN CALCIUM 80 MG/1
80 TABLET, FILM COATED ORAL NIGHTLY
COMMUNITY

## 2020-01-01 RX ORDER — FAMOTIDINE 20 MG/1
20 TABLET, FILM COATED ORAL 2 TIMES DAILY
Status: DISCONTINUED | OUTPATIENT
Start: 2020-01-01 | End: 2020-01-01

## 2020-01-01 RX ORDER — FAMOTIDINE 20 MG/1
20 TABLET, FILM COATED ORAL DAILY
Status: DISCONTINUED | OUTPATIENT
Start: 2020-01-01 | End: 2020-01-01 | Stop reason: HOSPADM

## 2020-01-01 RX ORDER — INSULIN ASPART 100 [IU]/ML
10 INJECTION, SOLUTION INTRAVENOUS; SUBCUTANEOUS
Status: DISCONTINUED | OUTPATIENT
Start: 2020-01-01 | End: 2020-01-01

## 2020-01-01 RX ORDER — POLYETHYLENE GLYCOL 3350 17 G/17G
17 POWDER, FOR SOLUTION ORAL DAILY
Status: DISCONTINUED | OUTPATIENT
Start: 2020-01-01 | End: 2020-01-01

## 2020-01-01 RX ORDER — SODIUM CHLORIDE 0.9 % (FLUSH) 0.9 %
10 SYRINGE (ML) INJECTION
Status: DISCONTINUED | OUTPATIENT
Start: 2020-01-01 | End: 2020-01-01

## 2020-01-01 RX ORDER — AZITHROMYCIN 250 MG/1
500 TABLET, FILM COATED ORAL DAILY
Status: DISCONTINUED | OUTPATIENT
Start: 2020-01-01 | End: 2020-01-01 | Stop reason: HOSPADM

## 2020-01-01 RX ORDER — SODIUM,POTASSIUM PHOSPHATES 280-250MG
2 POWDER IN PACKET (EA) ORAL
Status: DISCONTINUED | OUTPATIENT
Start: 2020-01-01 | End: 2020-01-01

## 2020-01-01 RX ORDER — CARVEDILOL 25 MG/1
25 TABLET ORAL 2 TIMES DAILY
Qty: 60 TABLET | Refills: 11
Start: 2020-01-01 | End: 2021-08-08

## 2020-01-01 RX ORDER — INSULIN ASPART 100 [IU]/ML
1-10 INJECTION, SOLUTION INTRAVENOUS; SUBCUTANEOUS
Status: DISCONTINUED | OUTPATIENT
Start: 2020-01-01 | End: 2020-01-01 | Stop reason: HOSPADM

## 2020-01-01 RX ORDER — OXYBUTYNIN CHLORIDE 5 MG/1
5 TABLET, EXTENDED RELEASE ORAL DAILY
COMMUNITY

## 2020-01-01 RX ORDER — ACETAMINOPHEN 325 MG/1
650 TABLET ORAL EVERY 4 HOURS PRN
Status: DISCONTINUED | OUTPATIENT
Start: 2020-01-01 | End: 2020-01-01 | Stop reason: HOSPADM

## 2020-01-01 RX ORDER — FUROSEMIDE 40 MG/1
40 TABLET ORAL DAILY
COMMUNITY

## 2020-01-01 RX ORDER — BISACODYL 10 MG
10 SUPPOSITORY, RECTAL RECTAL 2 TIMES DAILY
Status: DISCONTINUED | OUTPATIENT
Start: 2020-01-01 | End: 2020-01-01 | Stop reason: HOSPADM

## 2020-01-01 RX ORDER — CALCIUM CHLORIDE INJECTION 100 MG/ML
INJECTION, SOLUTION INTRAVENOUS CODE/TRAUMA/SEDATION MEDICATION
Status: COMPLETED | OUTPATIENT
Start: 2020-01-01 | End: 2020-01-01

## 2020-01-01 RX ORDER — ATORVASTATIN CALCIUM 80 MG/1
80 TABLET, FILM COATED ORAL DAILY
Qty: 90 TABLET | Refills: 3
Start: 2020-01-01 | End: 2021-08-09

## 2020-01-01 RX ORDER — CALCIPOTRIENE 50 UG/G
CREAM TOPICAL
COMMUNITY

## 2020-01-01 RX ORDER — ACETAMINOPHEN 500 MG
500 TABLET ORAL 2 TIMES DAILY PRN
COMMUNITY

## 2020-01-01 RX ORDER — AMOXICILLIN 250 MG
1 CAPSULE ORAL DAILY
Status: DISCONTINUED | OUTPATIENT
Start: 2020-01-01 | End: 2020-01-01

## 2020-01-01 RX ORDER — ONDANSETRON 2 MG/ML
4 INJECTION INTRAMUSCULAR; INTRAVENOUS EVERY 6 HOURS PRN
Status: DISCONTINUED | OUTPATIENT
Start: 2020-01-01 | End: 2020-01-01 | Stop reason: HOSPADM

## 2020-01-01 RX ORDER — CLOBETASOL PROPIONATE 0.5 MG/G
AEROSOL, FOAM TOPICAL EVERY MORNING
COMMUNITY

## 2020-01-01 RX ORDER — CHLORHEXIDINE GLUCONATE ORAL RINSE 1.2 MG/ML
SOLUTION DENTAL
COMMUNITY

## 2020-01-01 RX ORDER — CARVEDILOL 12.5 MG/1
12.5 TABLET ORAL 2 TIMES DAILY
Status: DISCONTINUED | OUTPATIENT
Start: 2020-01-01 | End: 2020-01-01

## 2020-01-01 RX ORDER — AMIODARONE HYDROCHLORIDE 200 MG/1
200 TABLET ORAL DAILY
Qty: 30 TABLET | Refills: 11
Start: 2020-01-01 | End: 2021-08-09

## 2020-01-01 RX ORDER — MIDAZOLAM HYDROCHLORIDE 1 MG/ML
2 INJECTION INTRAMUSCULAR; INTRAVENOUS
Status: COMPLETED | OUTPATIENT
Start: 2020-01-01 | End: 2020-01-01

## 2020-01-01 RX ORDER — HYDRALAZINE HYDROCHLORIDE 25 MG/1
75 TABLET, FILM COATED ORAL EVERY 6 HOURS
COMMUNITY

## 2020-01-01 RX ORDER — CLINDAMYCIN PHOSPHATE 10 UG/ML
LOTION TOPICAL 3 TIMES DAILY
COMMUNITY

## 2020-01-01 RX ORDER — POTASSIUM CHLORIDE 750 MG/1
10 TABLET, EXTENDED RELEASE ORAL DAILY
COMMUNITY

## 2020-01-01 RX ORDER — SENNOSIDES 8.6 MG/1
2 TABLET ORAL 2 TIMES DAILY
COMMUNITY

## 2020-01-01 RX ORDER — AMIODARONE HYDROCHLORIDE 200 MG/1
200 TABLET ORAL DAILY
COMMUNITY

## 2020-01-01 RX ORDER — ASPIRIN 81 MG/1
81 TABLET ORAL DAILY
Status: DISCONTINUED | OUTPATIENT
Start: 2020-01-01 | End: 2020-01-01 | Stop reason: HOSPADM

## 2020-01-01 RX ORDER — IBUPROFEN 200 MG
16 TABLET ORAL
Status: DISCONTINUED | OUTPATIENT
Start: 2020-01-01 | End: 2020-01-01 | Stop reason: HOSPADM

## 2020-01-01 RX ORDER — VANCOMYCIN HCL IN 5 % DEXTROSE 1G/250ML
1000 PLASTIC BAG, INJECTION (ML) INTRAVENOUS ONCE
Status: COMPLETED | OUTPATIENT
Start: 2020-01-01 | End: 2020-01-01

## 2020-01-01 RX ORDER — SODIUM BICARBONATE 1 MEQ/ML
SYRINGE (ML) INTRAVENOUS CODE/TRAUMA/SEDATION MEDICATION
Status: COMPLETED | OUTPATIENT
Start: 2020-01-01 | End: 2020-01-01

## 2020-01-01 RX ORDER — LANOLIN ALCOHOL/MO/W.PET/CERES
800 CREAM (GRAM) TOPICAL
Status: DISCONTINUED | OUTPATIENT
Start: 2020-01-01 | End: 2020-01-01

## 2020-01-01 RX ORDER — SILODOSIN 4 MG/1
8 CAPSULE ORAL DAILY
Status: DISCONTINUED | OUTPATIENT
Start: 2020-01-01 | End: 2020-01-01 | Stop reason: HOSPADM

## 2020-01-01 RX ORDER — FUROSEMIDE 10 MG/ML
80 INJECTION INTRAMUSCULAR; INTRAVENOUS ONCE
Status: COMPLETED | OUTPATIENT
Start: 2020-01-01 | End: 2020-01-01

## 2020-01-01 RX ORDER — EPINEPHRINE 0.1 MG/ML
INJECTION INTRAVENOUS CODE/TRAUMA/SEDATION MEDICATION
Status: COMPLETED | OUTPATIENT
Start: 2020-01-01 | End: 2020-01-01

## 2020-01-01 RX ORDER — AMOXICILLIN 250 MG
1 CAPSULE ORAL 2 TIMES DAILY
Status: DISCONTINUED | OUTPATIENT
Start: 2020-01-01 | End: 2020-01-01 | Stop reason: HOSPADM

## 2020-01-01 RX ORDER — TACROLIMUS 1 MG/G
OINTMENT TOPICAL 2 TIMES DAILY
Status: ON HOLD | COMMUNITY
End: 2020-01-01

## 2020-01-01 RX ORDER — NAPROXEN SODIUM 220 MG/1
81 TABLET, FILM COATED ORAL DAILY
COMMUNITY

## 2020-01-01 RX ADMIN — CARVEDILOL 25 MG: 25 TABLET, FILM COATED ORAL at 09:08

## 2020-01-01 RX ADMIN — INSULIN ASPART 1 UNITS: 100 INJECTION, SOLUTION INTRAVENOUS; SUBCUTANEOUS at 10:08

## 2020-01-01 RX ADMIN — IPRATROPIUM BROMIDE AND ALBUTEROL SULFATE 3 ML: .5; 2.5 SOLUTION RESPIRATORY (INHALATION) at 11:08

## 2020-01-01 RX ADMIN — APIXABAN 5 MG: 5 TABLET, FILM COATED ORAL at 10:08

## 2020-01-01 RX ADMIN — INSULIN ASPART 10 UNITS: 100 INJECTION, SOLUTION INTRAVENOUS; SUBCUTANEOUS at 06:08

## 2020-01-01 RX ADMIN — FAMOTIDINE 20 MG: 20 TABLET, FILM COATED ORAL at 08:08

## 2020-01-01 RX ADMIN — EPINEPHRINE 0.1 MG: 0.1 INJECTION, SOLUTION ENDOTRACHEAL; INTRACARDIAC; INTRAVENOUS at 08:08

## 2020-01-01 RX ADMIN — INSULIN ASPART 5 UNITS: 100 INJECTION, SOLUTION INTRAVENOUS; SUBCUTANEOUS at 02:08

## 2020-01-01 RX ADMIN — DEXTROSE MONOHYDRATE 25 G: 25 INJECTION, SOLUTION INTRAVENOUS at 08:08

## 2020-01-01 RX ADMIN — AZITHROMYCIN 500 MG: 250 TABLET, FILM COATED ORAL at 09:08

## 2020-01-01 RX ADMIN — FAMOTIDINE 20 MG: 20 TABLET, FILM COATED ORAL at 09:08

## 2020-01-01 RX ADMIN — CARVEDILOL 25 MG: 25 TABLET, FILM COATED ORAL at 01:08

## 2020-01-01 RX ADMIN — PIPERACILLIN SODIUM AND TAZOBACTAM SODIUM 4.5 G: 4; .5 INJECTION, POWDER, LYOPHILIZED, FOR SOLUTION INTRAVENOUS at 08:08

## 2020-01-01 RX ADMIN — PIPERACILLIN SODIUM AND TAZOBACTAM SODIUM 4.5 G: 4; .5 INJECTION, POWDER, LYOPHILIZED, FOR SOLUTION INTRAVENOUS at 12:08

## 2020-01-01 RX ADMIN — INSULIN ASPART 10 UNITS: 100 INJECTION, SOLUTION INTRAVENOUS; SUBCUTANEOUS at 04:08

## 2020-01-01 RX ADMIN — IPRATROPIUM BROMIDE AND ALBUTEROL SULFATE 3 ML: .5; 2.5 SOLUTION RESPIRATORY (INHALATION) at 08:08

## 2020-01-01 RX ADMIN — PIPERACILLIN SODIUM AND TAZOBACTAM SODIUM 4.5 G: 4; .5 INJECTION, POWDER, LYOPHILIZED, FOR SOLUTION INTRAVENOUS at 09:08

## 2020-01-01 RX ADMIN — TUBERCULIN PURIFIED PROTEIN DERIVATIVE 5 UNITS: 5 INJECTION, SOLUTION INTRADERMAL at 08:08

## 2020-01-01 RX ADMIN — IPRATROPIUM BROMIDE AND ALBUTEROL SULFATE 3 ML: .5; 2.5 SOLUTION RESPIRATORY (INHALATION) at 04:08

## 2020-01-01 RX ADMIN — HYDRALAZINE HYDROCHLORIDE 100 MG: 50 TABLET, FILM COATED ORAL at 02:08

## 2020-01-01 RX ADMIN — ASPIRIN 81 MG: 81 TABLET, COATED ORAL at 08:08

## 2020-01-01 RX ADMIN — DOCUSATE SODIUM 50MG AND SENNOSIDES 8.6MG 1 TABLET: 8.6; 5 TABLET, FILM COATED ORAL at 09:08

## 2020-01-01 RX ADMIN — CARVEDILOL 25 MG: 25 TABLET, FILM COATED ORAL at 08:08

## 2020-01-01 RX ADMIN — HYDRALAZINE HYDROCHLORIDE 25 MG: 25 TABLET, FILM COATED ORAL at 09:08

## 2020-01-01 RX ADMIN — IPRATROPIUM BROMIDE AND ALBUTEROL SULFATE 3 ML: .5; 2.5 SOLUTION RESPIRATORY (INHALATION) at 12:08

## 2020-01-01 RX ADMIN — VANCOMYCIN HYDROCHLORIDE 1000 MG: 1 INJECTION, POWDER, LYOPHILIZED, FOR SOLUTION INTRAVENOUS at 02:08

## 2020-01-01 RX ADMIN — INSULIN ASPART 8 UNITS: 100 INJECTION, SOLUTION INTRAVENOUS; SUBCUTANEOUS at 11:08

## 2020-01-01 RX ADMIN — APIXABAN 5 MG: 5 TABLET, FILM COATED ORAL at 09:08

## 2020-01-01 RX ADMIN — IPRATROPIUM BROMIDE AND ALBUTEROL SULFATE 3 ML: .5; 2.5 SOLUTION RESPIRATORY (INHALATION) at 05:08

## 2020-01-01 RX ADMIN — ATORVASTATIN CALCIUM 80 MG: 20 TABLET, FILM COATED ORAL at 08:08

## 2020-01-01 RX ADMIN — INSULIN ASPART 2 UNITS: 100 INJECTION, SOLUTION INTRAVENOUS; SUBCUTANEOUS at 09:08

## 2020-01-01 RX ADMIN — IPRATROPIUM BROMIDE AND ALBUTEROL SULFATE 3 ML: .5; 2.5 SOLUTION RESPIRATORY (INHALATION) at 03:08

## 2020-01-01 RX ADMIN — FUROSEMIDE 20 MG: 10 INJECTION, SOLUTION INTRAMUSCULAR; INTRAVENOUS at 11:08

## 2020-01-01 RX ADMIN — VANCOMYCIN HYDROCHLORIDE 1500 MG: 1.5 INJECTION, POWDER, LYOPHILIZED, FOR SOLUTION INTRAVENOUS at 01:08

## 2020-01-01 RX ADMIN — ASPIRIN 81 MG: 81 TABLET, COATED ORAL at 09:08

## 2020-01-01 RX ADMIN — INSULIN ASPART 5 UNITS: 100 INJECTION, SOLUTION INTRAVENOUS; SUBCUTANEOUS at 11:08

## 2020-01-01 RX ADMIN — HYDRALAZINE HYDROCHLORIDE 25 MG: 25 TABLET, FILM COATED ORAL at 08:08

## 2020-01-01 RX ADMIN — SILODOSIN 8 MG: 8 CAPSULE ORAL at 08:08

## 2020-01-01 RX ADMIN — AMIODARONE HYDROCHLORIDE 200 MG: 200 TABLET ORAL at 09:08

## 2020-01-01 RX ADMIN — EPINEPHRINE 0.1 MG: 0.1 INJECTION, SOLUTION ENDOTRACHEAL; INTRACARDIAC; INTRAVENOUS at 03:08

## 2020-01-01 RX ADMIN — PIPERACILLIN SODIUM AND TAZOBACTAM SODIUM 4.5 G: 4; .5 INJECTION, POWDER, LYOPHILIZED, FOR SOLUTION INTRAVENOUS at 03:08

## 2020-01-01 RX ADMIN — PIPERACILLIN SODIUM AND TAZOBACTAM SODIUM 4.5 G: 4; .5 INJECTION, POWDER, LYOPHILIZED, FOR SOLUTION INTRAVENOUS at 11:08

## 2020-01-01 RX ADMIN — POLYETHYLENE GLYCOL 3350 17 G: 17 POWDER, FOR SOLUTION ORAL at 02:08

## 2020-01-01 RX ADMIN — BISACODYL 10 MG: 10 SUPPOSITORY RECTAL at 10:08

## 2020-01-01 RX ADMIN — PIPERACILLIN SODIUM AND TAZOBACTAM SODIUM 4.5 G: 4; .5 INJECTION, POWDER, LYOPHILIZED, FOR SOLUTION INTRAVENOUS at 02:08

## 2020-01-01 RX ADMIN — INSULIN ASPART 5 UNITS: 100 INJECTION, SOLUTION INTRAVENOUS; SUBCUTANEOUS at 05:08

## 2020-01-01 RX ADMIN — DOCUSATE SODIUM 50MG AND SENNOSIDES 8.6MG 1 TABLET: 8.6; 5 TABLET, FILM COATED ORAL at 08:08

## 2020-01-01 RX ADMIN — INSULIN ASPART 10 UNITS: 100 INJECTION, SOLUTION INTRAVENOUS; SUBCUTANEOUS at 10:08

## 2020-01-01 RX ADMIN — HYDRALAZINE HYDROCHLORIDE 100 MG: 50 TABLET, FILM COATED ORAL at 09:08

## 2020-01-01 RX ADMIN — HYDRALAZINE HYDROCHLORIDE 25 MG: 25 TABLET, FILM COATED ORAL at 05:08

## 2020-01-01 RX ADMIN — ATORVASTATIN CALCIUM 80 MG: 20 TABLET, FILM COATED ORAL at 09:08

## 2020-01-01 RX ADMIN — HEPARIN SODIUM AND DEXTROSE 12 UNITS/KG/HR: 10000; 5 INJECTION INTRAVENOUS at 03:08

## 2020-01-01 RX ADMIN — HYDRALAZINE HYDROCHLORIDE 100 MG: 50 TABLET, FILM COATED ORAL at 05:08

## 2020-01-01 RX ADMIN — SODIUM BICARBONATE 100 MEQ: 84 INJECTION, SOLUTION INTRAVENOUS at 08:08

## 2020-01-01 RX ADMIN — AMIODARONE HYDROCHLORIDE 200 MG: 200 TABLET ORAL at 08:08

## 2020-01-01 RX ADMIN — INSULIN ASPART 4 UNITS: 100 INJECTION, SOLUTION INTRAVENOUS; SUBCUTANEOUS at 12:08

## 2020-01-01 RX ADMIN — CALCIUM CHLORIDE 1 G: 100 INJECTION, SOLUTION INTRAVENOUS at 08:08

## 2020-01-01 RX ADMIN — HYDRALAZINE HYDROCHLORIDE 100 MG: 50 TABLET, FILM COATED ORAL at 08:08

## 2020-01-01 RX ADMIN — PIPERACILLIN AND TAZOBACTAM 4.5 G: 4; .5 INJECTION, POWDER, LYOPHILIZED, FOR SOLUTION INTRAVENOUS; PARENTERAL at 01:08

## 2020-01-01 RX ADMIN — IPRATROPIUM BROMIDE AND ALBUTEROL SULFATE 3 ML: .5; 2.5 SOLUTION RESPIRATORY (INHALATION) at 09:08

## 2020-01-01 RX ADMIN — MIDAZOLAM HYDROCHLORIDE 2 MG: 1 INJECTION, SOLUTION INTRAMUSCULAR; INTRAVENOUS at 09:08

## 2020-01-01 RX ADMIN — POLYETHYLENE GLYCOL 3350 17 G: 17 POWDER, FOR SOLUTION ORAL at 08:08

## 2020-01-01 RX ADMIN — HYDRALAZINE HYDROCHLORIDE 75 MG: 50 TABLET, FILM COATED ORAL at 09:08

## 2020-01-01 RX ADMIN — APIXABAN 5 MG: 5 TABLET, FILM COATED ORAL at 08:08

## 2020-01-01 RX ADMIN — INSULIN ASPART 5 UNITS: 100 INJECTION, SOLUTION INTRAVENOUS; SUBCUTANEOUS at 08:08

## 2020-01-01 RX ADMIN — HUMAN ALBUMIN MICROSPHERES AND PERFLUTREN 0.66 MG: 10; .22 INJECTION, SOLUTION INTRAVENOUS at 12:08

## 2020-01-01 RX ADMIN — APIXABAN 5 MG: 2.5 TABLET, FILM COATED ORAL at 09:08

## 2020-01-01 RX ADMIN — POLYETHYLENE GLYCOL 3350 17 G: 17 POWDER, FOR SOLUTION ORAL at 10:08

## 2020-01-01 RX ADMIN — PIPERACILLIN SODIUM AND TAZOBACTAM SODIUM 4.5 G: 4; .5 INJECTION, POWDER, LYOPHILIZED, FOR SOLUTION INTRAVENOUS at 10:08

## 2020-01-01 RX ADMIN — INSULIN DETEMIR 50 UNITS: 100 INJECTION, SOLUTION SUBCUTANEOUS at 09:08

## 2020-01-01 RX ADMIN — SILODOSIN 8 MG: 8 CAPSULE ORAL at 09:08

## 2020-01-01 RX ADMIN — FAMOTIDINE 20 MG: 20 TABLET, FILM COATED ORAL at 01:08

## 2020-01-01 RX ADMIN — HYDRALAZINE HYDROCHLORIDE 100 MG: 50 TABLET, FILM COATED ORAL at 01:08

## 2020-01-01 RX ADMIN — HYDRALAZINE HYDROCHLORIDE 100 MG: 50 TABLET, FILM COATED ORAL at 10:08

## 2020-01-01 RX ADMIN — DOCUSATE SODIUM 50MG AND SENNOSIDES 8.6MG 1 TABLET: 8.6; 5 TABLET, FILM COATED ORAL at 10:08

## 2020-01-01 RX ADMIN — AZITHROMYCIN 500 MG: 250 TABLET, FILM COATED ORAL at 04:08

## 2020-01-01 RX ADMIN — FUROSEMIDE 80 MG: 10 INJECTION, SOLUTION INTRAMUSCULAR; INTRAVENOUS at 11:08

## 2020-01-01 RX ADMIN — POLYETHYLENE GLYCOL 3350 17 G: 17 POWDER, FOR SOLUTION ORAL at 09:08

## 2020-01-01 RX ADMIN — HYDRALAZINE HYDROCHLORIDE 25 MG: 25 TABLET, FILM COATED ORAL at 01:08

## 2020-01-01 RX ADMIN — INSULIN ASPART 5 UNITS: 100 INJECTION, SOLUTION INTRAVENOUS; SUBCUTANEOUS at 12:08

## 2020-01-01 RX ADMIN — INSULIN ASPART 4 UNITS: 100 INJECTION, SOLUTION INTRAVENOUS; SUBCUTANEOUS at 08:08

## 2020-01-01 RX ADMIN — INSULIN ASPART 10 UNITS: 100 INJECTION, SOLUTION INTRAVENOUS; SUBCUTANEOUS at 09:08

## 2020-01-01 RX ADMIN — FAMOTIDINE 20 MG: 20 TABLET ORAL at 09:08

## 2020-01-01 RX ADMIN — INSULIN ASPART 4 UNITS: 100 INJECTION, SOLUTION INTRAVENOUS; SUBCUTANEOUS at 05:08

## 2020-01-01 RX ADMIN — HYDRALAZINE HYDROCHLORIDE 100 MG: 50 TABLET, FILM COATED ORAL at 06:08

## 2020-08-07 PROBLEM — R41.89 UNRESPONSIVE: Status: ACTIVE | Noted: 2020-01-01

## 2020-08-08 PROBLEM — R41.89 UNRESPONSIVE: Status: RESOLVED | Noted: 2020-01-01 | Resolved: 2020-01-01

## 2020-08-08 PROBLEM — I48.20 CHRONIC ATRIAL FIBRILLATION: Status: ACTIVE | Noted: 2020-01-01

## 2020-08-08 PROBLEM — I10 ESSENTIAL HYPERTENSION: Status: ACTIVE | Noted: 2020-01-01

## 2020-08-08 PROBLEM — J15.9 COMMUNITY ACQUIRED BACTERIAL PNEUMONIA: Status: ACTIVE | Noted: 2020-01-01

## 2020-08-08 PROBLEM — R41.89 UNRESPONSIVE: Status: ACTIVE | Noted: 2020-01-01

## 2020-08-08 PROBLEM — E11.9 TYPE 2 DIABETES MELLITUS: Status: ACTIVE | Noted: 2020-01-01

## 2020-08-08 PROBLEM — G45.9 TIA (TRANSIENT ISCHEMIC ATTACK): Status: ACTIVE | Noted: 2020-01-01

## 2020-08-08 NOTE — HOSPITAL COURSE
Patient accepted as a transfer to [facility] for evaluation of TIA- patient described episode of inability to respond- though aware and conscious of others around him. No control of his entire body or voice for unknown amount of time. No new medications given prior to this episode. Vascular neurology consulted for possible TIA upon arrival. The patient was transferred from [place] with LI heparin gtt. CT/CTA negative from OSH- reviewed by primary team. MRI at [facility] negative for acute intracranial process- reviewed by vascular neurology. Patient without evidence of TIA or stroke on exam- no focal deficits- NIHSS of 0. As such, vascular neurology has no recommendations for this patient from a stroke standpoint as this was effectively ruled out via imaging. Patient remains on vanc and zosyn for CAP- was originally admitted to hospital for such. On home oxygen of 2 L's at baseline per patient, but states for his CHF (?). Denies smoking history or history of COPD. Apparently from [place] where he is a  and was in [place] for vacation when he developed fevers, chills and fatigue. States was COVID negative. Notably history positive for a fib but not previously on AC. Unclear if prior bleed history given transfer and vascular neurology acting as a consultant service. Recommend that LI heparin be DC'ed and Eliquis 5 mg BID be started as the patient has a CHADS-Vasc score of 5 and HAS-BLED score of 2.     On 8/9/2020- patient to be stepped down from [facility] team. Patient on Day 4/5 of Vanc/Zosyn for CAP (could possibly be de-escalated to typical CAP coverage though with persistent increasing leukocytosis). Patient's MRI reviewed with no evidence of acute intracranial abnormalities. Evidence on chronic microvascular changes. Increasing leukocytosis. Workup per primary with amylase, lipase, and D-dimer relatively unremarkable except D- dimer elevated at 0.93. Patient stepped down to vascular neurology on 8/10.     [facility]  consulted for persistent leukocytosis and for potential transfer to  primary for CAP.

## 2020-08-08 NOTE — CONSULTS
Inpatient consult to Physical Medicine Rehab  Consult performed by: Ruchi So NP  Consult ordered by: hSamir Lovell NP  Reason for consult: Assess rehab needs      Reviewed patient history and current admission.  Rehab team following.  Full consult to follow.    WEN Eid, FNP-C  Physical Medicine & Rehabilitation   08/08/2020  Spectralink: [phone]

## 2020-08-08 NOTE — PLAN OF CARE
Recommendations    Recommendation:   1. Continue 2000 kcal DM/cardiac diet, encourage good PO intake   2. If PO declines <50% of meals, add boost glucose daily to increase intake   RD to monitor and follow up    Goals: pt to tolerate >85% of EEN/EPN by RD follow up  Nutrition Goal Status: new  Communication of RD Recs: other (comment)(POC)

## 2020-08-08 NOTE — NURSING
Patient arrived to [facility] from [facility] via [facility] 15 to [id number]  Type of stroke/diagnosis:  AMS      Current symptoms: oriented x4, on bipap    Skin assessment done: Yes  Wounds noted: discoloration BLE, skin tear to L elbow  KRISTAL Armband Applied: (yes or no & why not)  Patient Belongings on Admit: (be specific): blue milly shorts with brown belt, navy lined collared shirt, black hat, black crocs, black cloth face mask, iphone with black case, calcipotriene cream x2, clobetasol creamx2, clindamycin lotion, tacrolimus ointment, latanoprost drops, gold wedding band    Per Patient: wallet given to wife before leaving other facility    NCC notified: MD Cordell    [facility]

## 2020-08-08 NOTE — ASSESSMENT & PLAN NOTE
IV Vancomycin / Zosyn at OSH  Will continue therapy at this time  Albuterol Nebulizers q4 hours  Maintain O2 Sat >88%  Currently on NC 2Liters  CXR PRN

## 2020-08-08 NOTE — PLAN OF CARE
Problem: Physical Therapy Goal  Goal: Physical Therapy Goal  Description: Goals to be met by:      Patient will increase functional independence with mobility by performin. Supine to sit with Stand-by Assistance  2. Sit to supine with Stand-by Assistance  3. Sit to stand transfer with Stand-by Assistance  4. Gait  x 100 feet with Supervision using Single-point Cane .   5. Lower extremity exercise program x15 reps per handout, with independence to improve strength and activity tolerance.   6. Ambulate 2 minutes with stand by assistance with no AD while performing dynamic head turns, sudden change in speed and direction, withstand dynamic perturbations with no loss of balance.         Outcome: Ongoing, Progressing   Evaluation completed, initiated plan of care.   Yee Murray, PT  2020

## 2020-08-08 NOTE — PLAN OF CARE
NATALIA notified that team would like patient to return to [facility] to resume care.     NATALIA called [facility] ([id number]) and submitted request.     UPDATE 4:27 PM  Transfer center requested confirmation that case management would cover the cost of transfer for the patient as insurance would not since it was not a transfer to a higher level of care. NATALIA called  of Case Management, Zohra Ling, who confirmed it would be OK for case management to cover this cost. NATALIA called transfer center ([id number]) and provided this information. [facility] had not confirmed acceptance back at that time.     UPDATE 4:43 PM:  Chart copied and provided to unit secretary via tube in case transfer accepted.     Alicia Suarez LMSW   - Case Management

## 2020-08-08 NOTE — PLAN OF CARE
Problem: Occupational Therapy Goal  Goal: Occupational Therapy Goal  Description: Goals to be met by: 8/18     Patient will increase functional independence with ADLs by performing:    UE Dressing with Supervision.  LE Dressing with Supervision.  Grooming while standing with Supervision.  Toileting from toilet with Supervision for hygiene and clothing management.   Supine to sit with Supervision.  Step transfer with Supervision    Outcome: Ongoing, Progressing     OT eval completed.

## 2020-08-08 NOTE — ASSESSMENT & PLAN NOTE
70 y/o M with extensive medical history who presents from OSH for TIA while being treated for PNA  CTH and CTA negative at OSH. Neurologically intact on transfer and arrival to [facility]    Admit to NCC  -Neuro Checks q 2 hours  -MAP>65, SBP <160  -Continue Vancomycin / Zosyn for Community Acquired Pneumonia  -Rate Controlled Atrial Fibrillation  -Resume Heparin gtt (Low Intensity)  -Cardiac/Diabetic Diet  -Vascular Neurology Consult   -DVT PPX: Heparin gtt  -GI PPX: H2B  -PT/OT

## 2020-08-08 NOTE — HPI
"68 y/o M with hx of HTN, Atrial Fibrillation, HLD, DM2, CKD, Gout, HFpEF who was admitted to OSH for RUL Pneumonia 3 days ago.   Today patient had episode of AMS, unresponsiveness. He states, "I could listen to everyone but I couldn't move".  CT Head and CTA of Head were negative at OSH (CDs and Reports in Chart). After several hours, symptoms and deficits resolved spontaneously.   He was treated for TIA with Heparin gtt and transferred to [facility] for higher level of care.    Upon arrival, patient alert, oriented x4 and in no acute distress.  GCS15, No neurological Deficits   "

## 2020-08-08 NOTE — PT/OT/SLP EVAL
Occupational Therapy   Evaluation    Name: Amadeo Stevenson  MRN: [id number]  Admitting Diagnosis:  Community acquired bacterial pneumonia      Recommendations:     Discharge Recommendations: home health OT  Discharge Equipment Recommendations:  none  Barriers to discharge:  None    Assessment:     Amadeo Stevenson is a 69 y.o. male with a medical diagnosis of Community acquired bacterial pneumonia.  He presents with decreased independence with ADL's. Pt is at risk for falls. Performance deficits affecting function: weakness, impaired endurance, impaired self care skills, impaired functional mobilty, impaired balance, decreased upper extremity function, decreased lower extremity function, decreased safety awareness, impaired cognition.      Rehab Prognosis: Fair; patient would benefit from acute skilled OT services to address these deficits and reach maximum level of function.       Plan:     Patient to be seen 4 x/week to address the above listed problems via self-care/home management, therapeutic activities, therapeutic exercises, neuromuscular re-education  · Plan of Care Expires: 09/07/20  · Plan of Care Reviewed with: patient    Subjective     Chief Complaint: none stated  Patient/Family Comments/goals: none stated    Occupational Profile:  Living Environment: Pt resides with spouse in 1 story house with no steps to enter.  Pt reported that he was independent with ADL's & ambulated using SPC PTA.  Pt reports that he uses 2 liters O2 at rest & 4 liters with exertion. 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  Pt is an active .  Equipment Used at Home:  cane, straight(O2)  Assistance upon Discharge: yes per pt    Pain/Comfort:  · Pain Rating 1: 0/10  · Pain Rating Post-Intervention 1: 0/10    Patients cultural, spiritual, Muslim conflicts given the current situation: no    Objective:     Communicated with: RN prior to session.  Patient  found supine with telemetry, pulse ox (continuous), bed alarm, peripheral IV upon OT entry to room. Pt with no family present.    General Precautions: Standard, fall, aspiration   Orthopedic Precautions:N/A   Braces: N/A     Occupational Performance:    Bed Mobility:    · Patient completed Supine to Sit with minimum assistance  · Patient completed Sit to Supine with contact guard assistance    Functional Mobility/Transfers:  · Patient completed Sit <> Stand Transfer with contact guard assistance  with  no assistive device   · Functional Mobility: CGA with functional mobility in room    Activities of Daily Living:  · Upper Body Dressing: minimum assistance seated EOB  · Lower Body Dressing: moderate assistance donning socks seated EOB while seated EOB    Cognitive/Visual Perceptual:  Cognitive/Psychosocial Skills:     -       Oriented to: Person, Place, Time and Situation   -       Follows Commands/attention:Follows one-step commands  -       Safety awareness/insight to disability: impaired     Physical Exam:  Sensation:    -       Intact  Dominant hand:    -       right  Upper Extremity Range of Motion:     -       Right Upper Extremity: WFL except 90* shoulder flexion  -       Left Upper Extremity: WFL except 90* shoulder flexion  Upper Extremity Strength:    -       Right Upper Extremity: WFL except 3-/5 shoulder flexion  -       Left Upper Extremity: WFL except 3-/5 shoulder flexion   Strength:    -       Right Upper Extremity: WFL  -       Left Upper Extremity: WFL    AMPAC 6 Click ADL:  AMPAC Total Score: 16    Treatment & Education:  Pt with delayed responses to questions/directions during session.  Pt with eyes closed for ~ 50% of session.  Provided education regarding role of OT, POC, safety, need for (A) with OOB activities, & discharge recommendations. Pt had no further questions & when asked whether there were any concerns pt reported none.    Education:    Patient left supine with all lines intact,  call button in reach, bed alarm on and RN notified    GOALS:   Multidisciplinary Problems     Occupational Therapy Goals        Problem: Occupational Therapy Goal    Goal Priority Disciplines Outcome Interventions   Occupational Therapy Goal     OT, PT/OT Ongoing, Progressing    Description: Goals to be met by: 8/18     Patient will increase functional independence with ADLs by performing:    UE Dressing with Supervision.  LE Dressing with Supervision.  Grooming while standing with Supervision.  Toileting from toilet with Supervision for hygiene and clothing management.   Supine to sit with Supervision.  Step transfer with Supervision                     History:     Past Medical History:   Diagnosis Date    CHF (congestive heart failure), NYHA class II, acute, combined     Chronic a-fib     CKD (chronic kidney disease)     Diabetes mellitus type 2, uncontrolled, with complications     Gout     HTN (hypertension), benign        History reviewed. No pertinent surgical history.    Time Tracking:     OT Date of Treatment: 08/08/20  OT Start Time: 1114  OT Stop Time: 1132  OT Total Time (min): 18 min    Billable Minutes:Evaluation 10  Therapeutic Activity 8    FERN Gillespie  8/8/2020

## 2020-08-08 NOTE — PLAN OF CARE
POC reviewed with pt and family at 1400. Pt verbalized understanding. Questions and concerns addressed. No acute events today. MRI complete. Spot EEG completed. Heparin gtt continued, therapeutic. Pt remains of 3L NC. Pt progressing toward goals. Will continue to monitor. See flowsheets for full assessment and VS info.

## 2020-08-08 NOTE — PLAN OF CARE
On-call SW attempted to reach patient and spouse by phone to complete discharge planning assessment and was unable to reach either.     Alicia Suarez, KIMMIESW   - Case Management

## 2020-08-08 NOTE — ASSESSMENT & PLAN NOTE
68 y/o male being treated for PNA and had epsidoe that lasted a few hours where he was totally unresponsive. He was started on heparin drip and transfer to [facility]. Supposedly TIA but the symptomology does not match up with this    Antithrombotics: heparin drip    Statins: Lipitor 40 mg    Aggressive risk factor modification: HTN, DM, HLD, Diet, Exercise, Obesity, A-Fib, CHF, CKD     Rehab efforts: The patient has been evaluated by a stroke team provider and the therapy needs have been fully considered based off the presenting complaints and exam findings. The following therapy evaluations are needed: None    Diagnostics ordered/pending: MRI head without contrast to assess brain parenchyma, TTE to assess cardiac function/status     VTE prophylaxis: Mechanical prophylaxis: Place SCDs  None: Reason for No Pharmacological VTE Prophylaxis: Currently on anticoagulation    BP parameters: unsure if ischemic event normotensive       Detail Level: Detailed Size Of Lesion: 3mm x 3mm

## 2020-08-08 NOTE — PROCEDURES
DATE: 8/8/20    EEG NUMBER: FH     REFERRING PHYSICIAN:  Dr. Webster      This EEG was performed to assess for subclinical seizures      ELECTROENCEPHALOGRAM REPORT     METHODOLOGY:  Electroencephalographic (EEG) is recorded with electrodes placed according to the International 10-20 placement system.  Thirty two (32) channels of digital signal (sampling rate of 512/sec), including T1 and T2, were simultaneously recorded from the scalp and may include EKG, EMG, and/or eye monitors.  Recording band pass was 0.1 to 512 Hz.  Digital video recording of the patient is simultaneously recorded with the EEG.  The patient is instructed to report clinical symptoms which may occur during the recording session.  EEG and video recording are stored and archived in digital format.  Activation procedures, which include photic stimulation, hyperventilation and instructing patients to perform simple tasks, are done in selected patients.     The EEG is displayed on a monitor screen and can be reviewed using different montages.  Computer-assisted analysis is employed to detect spike and electrographic seizure activity.  The entire record is submitted for computer analysis.  The entire recording is visually reviewed, and the times identified by computer analysis as being spikes or seizures are reviewed again.     Compressed spectral analysis (CSA) is also performed on the activity recorded from each individual channel.  This is displayed as a power display of frequencies from 0 to 30 Hz over time.  The CSA is reviewed looking for asymmetries in power between homologous areas of the scalp, then compared with the original EEG recording.     [employer] software was also utilized in the review of this study.  This software suite analyzes the EEG recording in multiple domains.  Coherence and rhythmicity are computed to identify EEG sections which may contain organized seizures.  Each channel undergoes analysis to detect the presence of  spike and sharp waves which have special and morphological characteristics of epileptic activity.  The routine EEG recording is converted from special into frequency domain.  This is then displayed comparing homologous areas to identify areas of significant asymmetry.  Algorithm to identify non-cortically generated artifact is used to separate artifact from the EEG.     EEG FINDINGS:  The recording was obtained with a number of standard bipolar and referential montages during wakefulness, drowsiness.  In the alert state, the posterior background rhythm was a symmetric, well-modulated 10-11 Hz activity, which reacted symmetrically to eye opening.   During drowsiness, the background rhythm waxed and waned and there were periods of slowing.  There were no interictal epileptiform abnormalities and no clinical or electrographic seizures were recorded.  Excessive beta range activity was noted throughout the record shows diffuse    The EKG channel revealed a sinus rhythm.     IMPRESSION:  This is a normal EEG during wakefulness, drowsiness.      CLINICAL CORRELATION:  The patient is a 69 year-old male who is being evaluated for episodes of inability to speak. The patient is currently not maintained on any antiseizure medications.  This is a normal EEG during wakefulness and drowsiness.  There is no evidence of an epileptic process on this recording.  No seizures were recorded during this study.

## 2020-08-08 NOTE — NURSING
MD Cordell notified of pt bp 195/88. RN instructed to administer carvedilol PO. No PRN IV medications ordered. WCTM.

## 2020-08-08 NOTE — NURSING
Pharmacy called about heparin gtt. Not verified as of 0315 and was due to be hung by 0200. Will pull from [place] and administer when verified.

## 2020-08-08 NOTE — SUBJECTIVE & OBJECTIVE
Past Medical History:   Diagnosis Date    CHF (congestive heart failure), NYHA class II, acute, combined     Chronic a-fib     CKD (chronic kidney disease)     Diabetes mellitus type 2, uncontrolled, with complications     Gout     HTN (hypertension), benign      History reviewed. No pertinent surgical history.  History reviewed. No pertinent family history.  Social History     Tobacco Use    Smoking status: Not on file   Substance Use Topics    Alcohol use: Not on file    Drug use: Not on file     Review of patient's allergies indicates:  Not on File    Medications: I have reviewed the current medication administration record.    Medications Prior to Admission   Medication Sig Dispense Refill Last Dose    calcipotriene (DOVONOX) 0.005 % cream Apply topically 2 (two) times daily.       clindamycin (CLEOCIN T) 1 % lotion Apply topically 2 (two) times daily.       clobetasol 0.05% (TEMOVATE) 0.05 % Oint Apply topically 2 (two) times daily.       latanoprost 0.005 % ophthalmic solution Place 1 drop into both eyes every evening.       tacrolimus (PROTOPIC) 0.1 % ointment Apply topically 2 (two) times daily.          Review of Systems   Constitutional: Negative for chills and fever.   HENT: Negative for ear discharge and ear pain.    Eyes: Negative for pain and itching.   Respiratory: Positive for cough and shortness of breath.    Cardiovascular: Negative for chest pain and leg swelling.   Gastrointestinal: Negative for abdominal distention and abdominal pain.   Genitourinary: Negative for dysuria.   Musculoskeletal: Positive for arthralgias. Negative for back pain.   Skin: Negative for rash and wound.   Neurological: Negative for speech difficulty and weakness.     Objective:     Vital Signs (Most Recent):  Pulse: 72 (08/08/20 0145)  Resp: (!) 23 (08/08/20 0028)  BP: (!) 190/80 (08/08/20 0145)  SpO2: 97 % (08/08/20 0028)    Vital Signs Range (Last 24H):  Pulse:  [72-96]   Resp:  [14-23]   BP:  (158-190)/(80-85)   SpO2:  [93 %-98 %]     Physical Exam  Vitals signs and nursing note reviewed.   Constitutional:       Appearance: Normal appearance.      Comments: Obese     HENT:      Head: Normocephalic and atraumatic.   Eyes:      Extraocular Movements: Extraocular movements intact.      Pupils: Pupils are equal, round, and reactive to light.   Cardiovascular:      Rate and Rhythm: Normal rate. Rhythm irregular.   Abdominal:      General: Abdomen is flat.      Palpations: Abdomen is soft.   Skin:     General: Skin is warm and dry.   Neurological:      General: No focal deficit present.      Mental Status: He is alert and oriented to person, place, and time.         Neurological Exam:   LOC: alert  Attention Span: Good   Language: No aphasia  Articulation: No dysarthria  Orientation: Person, Place, Time   Visual Fields: Full  EOM (CN III, IV, VI): Full/intact  Pupils (CN II, III): PERRL  Facial Sensation (CN V): Normal  Facial Movement (CN VII): Symmetric facial expression    Gag Reflex: present  Reflexes: flexor plantar responses bilaterally  Motor: Arm left  Normal 5/5  Leg left  Normal 5/5  Arm right  Normal 5/5  Leg right Normal 5/5  Cebellar: No evidence of appendicular or axial ataxia  Sensation: Intact to light touch, temperature and vibration  Tone: Normal tone throughout      Laboratory:  CMP:   Recent Labs   Lab 08/07/20  2345   CALCIUM 8.7   ALBUMIN 2.5*   PROT 6.9      K 4.6   CO2 26      BUN 58*   CREATININE 2.1*   ALKPHOS 89   ALT 26   AST 25   BILITOT 0.7     CBC:   Recent Labs   Lab 08/08/20 0115   WBC 11.76  11.76   RBC 3.78*  3.78*   HGB 10.2*  10.2*   HCT 32.1*  32.1*     152   MCV 85  85   MCH 27.0  27.0   MCHC 31.8*  31.8*     Lipid Panel:   Recent Labs   Lab 08/08/20 0115   CHOL 89*   LDLCALC 50.6*   HDL 25*   TRIG 67     Coagulation:   Recent Labs   Lab 08/08/20 0115   INR 1.1   APTT 34.2*  34.2*     Hgb A1C:   Recent Labs   Lab 08/08/20 0115   HGBA1C 7.9*      TSH:   Recent Labs   Lab 08/08/20  0115   TSH 0.370*       Diagnostic Results:      Brain imaging:      Vessel Imaging:      Cardiac Evaluation:

## 2020-08-08 NOTE — ASSESSMENT & PLAN NOTE
Currently Rate Controlled Afib  Continue Amiodarone 200 mg PO daily  Heparin gtt for anticoagulation  Will need to start PO regimen prior to discharge  Maintain K>4 and Mg>2

## 2020-08-08 NOTE — HPI
70 y/o male who was admitted to [facility] in [place] for PNA RUL 3 days ago. The chart that was sent with the patient is missing many records so unable to piece together events leading up to the event were patient became unresponsive. He states that he could hear everything but could not move. CT and CTA head done with no acute findings. After several hours symptoms resolved and patient with no focal neuro deficits. He was started on heparin drip moved to ICU and transfer request was made to transfer to [facility] for possible TIA?.   Upon evaluation patient with no focal neuro deficits.   Patient with afib, HTN, DM, CKD, CHF. Patient on ABX of PNA.  Not really sure patient had a TIA but possible given afib and not on anticoagulation. Also symptomology does not correlate with TIA with total unresponsiveness.

## 2020-08-08 NOTE — H&P
"[facility]  Neurocritical Care  History & Physical    Admit Date: 8/7/2020  Service Date: 08/08/2020  Length of Stay: 1    Subjective:     Chief Complaint: <principal problem not specified>    History of Present Illness: 68 y/o M with hx of HTN, Atrial Fibrillation, HLD, DM2, CKD, Gout, HFpEF who was admitted to OSH for RUL Pneumonia 3 days ago.   Today patient had episode of AMS, unresponsiveness. He states, "I could listen to everyone but I couldn't move".  CT Head and CTA of Head were negative at OSH (CDs and Reports in Chart). After several hours, symptoms and deficits resolved spontaneously.   He was treated for TIA with Heparin gtt and transferred to [facility] for higher level of care.    Upon arrival, patient alert, oriented x4 and in no acute distress.  GCS15, No neurological Deficits     No past medical history on file.  No past surgical history on file.   No current facility-administered medications on file prior to encounter.      Current Outpatient Medications on File Prior to Encounter   Medication Sig Dispense Refill    calcipotriene (DOVONOX) 0.005 % cream Apply topically 2 (two) times daily.      clindamycin (CLEOCIN T) 1 % lotion Apply topically 2 (two) times daily.      clobetasol 0.05% (TEMOVATE) 0.05 % Oint Apply topically 2 (two) times daily.      latanoprost 0.005 % ophthalmic solution Place 1 drop into both eyes every evening.      tacrolimus (PROTOPIC) 0.1 % ointment Apply topically 2 (two) times daily.        Allergies: Patient has no allergy information on record.    No family history on file.  Social History     Tobacco Use    Smoking status: Not on file   Substance Use Topics    Alcohol use: Not on file    Drug use: Not on file     Review of Systems   Constitutional: Negative.    HENT: Negative.    Eyes: Negative.    Respiratory: Positive for shortness of breath.    Cardiovascular: Negative.    Gastrointestinal: Negative.    Endocrine: Negative.    Genitourinary: " Negative.    Musculoskeletal: Negative.    Skin: Negative.    Allergic/Immunologic: Negative.    Neurological: Negative.    Hematological: Negative.    Psychiatric/Behavioral: Negative.      Objective:     Vitals:    Pulse: 91  Resp: (!) 23  SpO2: 97 %  Oxygen Concentration (%): 3  O2 Device (Oxygen Therapy): nasal cannula    Pulse  Min: 90  Max: 96  BP  Min: 158/85  Max: 158/85  Resp  Min: 14  Max: 23  SpO2  Min: 93 %  Max: 98 %  Oxygen Concentration (%)  Min: 3  Max: 40    No intake/output data recorded.           Physical Exam  Constitutional:       Appearance: Normal appearance. He is well-developed. He is obese.   HENT:      Head: Normocephalic and atraumatic.      Nose: Nose normal.      Mouth/Throat:      Mouth: Mucous membranes are dry.   Eyes:      Extraocular Movements: Extraocular movements intact.      Pupils: Pupils are equal, round, and reactive to light.   Neck:      Musculoskeletal: Normal range of motion and neck supple.   Cardiovascular:      Rate and Rhythm: Normal rate. Rhythm irregular.   Pulmonary:      Effort: Pulmonary effort is normal.      Breath sounds: Normal breath sounds.   Abdominal:      General: Abdomen is flat.      Palpations: Abdomen is soft.   Skin:     General: Skin is warm and dry.   Neurological:      General: No focal deficit present.      Mental Status: He is alert and oriented to person, place, and time.      GCS: GCS eye subscore is 4. GCS verbal subscore is 5. GCS motor subscore is 6.      Cranial Nerves: Cranial nerves are intact.      Sensory: Sensation is intact.      Motor: Motor function is intact.      Coordination: Coordination is intact.      Gait: Gait is intact.   Psychiatric:         Mood and Affect: Mood normal.         Behavior: Behavior normal. Behavior is cooperative.         Thought Content: Thought content normal.         Judgment: Judgment normal.     Today I personally reviewed pertinent medications, lines/drains/airways, imaging, cardiology results,  laboratory results, microbiology results, notably:        Assessment/Plan:     Neuro  TIA (transient ischemic attack)  70 y/o M with extensive medical history who presents from OSH for TIA while being treated for PNA  CTH and CTA negative at OSH. Neurologically intact on transfer and arrival to C    Admit to NCC  -Neuro Checks q 2 hours  -MAP>65, SBP <160  -Continue Vancomycin / Zosyn for Community Acquired Pneumonia  -Rate Controlled Atrial Fibrillation  -Resume Heparin gtt (Low Intensity)  -Cardiac/Diabetic Diet  -Vascular Neurology Consult   -DVT PPX: Heparin gtt  -GI PPX: H2B  -PT/OT    Pulmonary  Community acquired bacterial pneumonia  IV Vancomycin / Zosyn at OSH  Will continue therapy at this time  Albuterol Nebulizers q4 hours  Maintain O2 Sat >88%  Currently on NC 2Liters  CXR PRN    Cardiac/Vascular  Essential hypertension  SBP<160  Coreg / Lasix / Amiodarone Resumed from Home Meds  Will hold ACE-I due to Renal Function    Chronic atrial fibrillation  Currently Rate Controlled Afib  Continue Amiodarone 200 mg PO daily  Heparin gtt for anticoagulation  Will need to start PO regimen prior to discharge  Maintain K>4 and Mg>2    Endocrine  Type 2 diabetes mellitus  ISS      The patient is being Prophylaxed for:  Venous Thromboembolism with: Mechanical or Chemical  Stress Ulcer with: H2B  Ventilator Pneumonia with: not applicable    Activity Orders          Diet Cardiac: Cardiac starting at 08/08 0040        Full Code    Cordell Morgan MD  Neurocritical Care  [facility]

## 2020-08-08 NOTE — SUBJECTIVE & OBJECTIVE
No past medical history on file.  No past surgical history on file.   No current facility-administered medications on file prior to encounter.      Current Outpatient Medications on File Prior to Encounter   Medication Sig Dispense Refill    calcipotriene (DOVONOX) 0.005 % cream Apply topically 2 (two) times daily.      clindamycin (CLEOCIN T) 1 % lotion Apply topically 2 (two) times daily.      clobetasol 0.05% (TEMOVATE) 0.05 % Oint Apply topically 2 (two) times daily.      latanoprost 0.005 % ophthalmic solution Place 1 drop into both eyes every evening.      tacrolimus (PROTOPIC) 0.1 % ointment Apply topically 2 (two) times daily.        Allergies: Patient has no allergy information on record.    No family history on file.  Social History     Tobacco Use    Smoking status: Not on file   Substance Use Topics    Alcohol use: Not on file    Drug use: Not on file     Review of Systems   Constitutional: Negative.    HENT: Negative.    Eyes: Negative.    Respiratory: Positive for shortness of breath.    Cardiovascular: Negative.    Gastrointestinal: Negative.    Endocrine: Negative.    Genitourinary: Negative.    Musculoskeletal: Negative.    Skin: Negative.    Allergic/Immunologic: Negative.    Neurological: Negative.    Hematological: Negative.    Psychiatric/Behavioral: Negative.      Objective:     Vitals:    Pulse: 91  Resp: (!) 23  SpO2: 97 %  Oxygen Concentration (%): 3  O2 Device (Oxygen Therapy): nasal cannula    Pulse  Min: 90  Max: 96  BP  Min: 158/85  Max: 158/85  Resp  Min: 14  Max: 23  SpO2  Min: 93 %  Max: 98 %  Oxygen Concentration (%)  Min: 3  Max: 40    No intake/output data recorded.           Physical Exam  Constitutional:       Appearance: Normal appearance. He is well-developed. He is obese.   HENT:      Head: Normocephalic and atraumatic.      Nose: Nose normal.      Mouth/Throat:      Mouth: Mucous membranes are dry.   Eyes:      Extraocular Movements: Extraocular movements intact.       Pupils: Pupils are equal, round, and reactive to light.   Neck:      Musculoskeletal: Normal range of motion and neck supple.   Cardiovascular:      Rate and Rhythm: Normal rate. Rhythm irregular.   Pulmonary:      Effort: Pulmonary effort is normal.      Breath sounds: Normal breath sounds.   Abdominal:      General: Abdomen is flat.      Palpations: Abdomen is soft.   Skin:     General: Skin is warm and dry.   Neurological:      General: No focal deficit present.      Mental Status: He is alert and oriented to person, place, and time.      GCS: GCS eye subscore is 4. GCS verbal subscore is 5. GCS motor subscore is 6.      Cranial Nerves: Cranial nerves are intact.      Sensory: Sensation is intact.      Motor: Motor function is intact.      Coordination: Coordination is intact.      Gait: Gait is intact.   Psychiatric:         Mood and Affect: Mood normal.         Behavior: Behavior normal. Behavior is cooperative.         Thought Content: Thought content normal.         Judgment: Judgment normal.     Today I personally reviewed pertinent medications, lines/drains/airways, imaging, cardiology results, laboratory results, microbiology results, notably:

## 2020-08-08 NOTE — DISCHARGE SUMMARY
[facility]  Vascular Neurology  Comprehensive Stroke Center  Discharge Summary     Summary:     Admit Date: 8/7/2020 11:32 PM    Discharge Date and Time:  08/08/2020 4:21 PM    Attending Physician: Finesse Webster MD     Discharge Provider: Britta Guerra DO    History of Present Illness: 68 y/o male who was admitted to [facility] in [place] for PNA RUL 3 days ago. The chart that was sent with the patient is missing many records so unable to piece together events leading up to the event were patient became unresponsive. He states that he could hear everything but could not move. CT and CTA head done with no acute findings. After several hours symptoms resolved and patient with no focal neuro deficits. He was started on heparin drip moved to ICU and transfer request was made to transfer to [facility] for possible TIA?.   Upon evaluation patient with no focal neuro deficits.   Patient with afib, HTN, DM, CKD, CHF. Patient on ABX of PNA.  Not really sure patient had a TIA but possible given afib and not on anticoagulation. Also symptomology does not correlate with TIA with total unresponsiveness.     Hospital Course (synopsis of major diagnoses, care, treatment, and services provided during the course of the hospital stay): Patient accepted as a transfer to [facility] for evaluation of TIA- patient described episode of inability to respond- though aware and conscious of others around him. No control of his entire body or voice for unknown amount of time. No new medications given prior to this episode. Vascular neurology consulted for possible TIA upon arrival. The patient was transferred from [place] with LI heparin gtt. CT/CTA negative from OSH- reviewed by primary team. MRI at [facility] negative for acute intracranial process- reviewed by vascular neurology. Patient without evidence of TIA or stroke on exam- no focal deficits- NIHSS of 0. As such, vascular neurology has no  recommendations for this patient from a stroke standpoint as this was effectively ruled out via imaging. Patient remains on vanc and zosyn for CAP- was originally admitted to hospital for such. On home oxygen of 2 L's at baseline per patient, but states for his CHF (?). Denies smoking history or history of COPD. Apparently from [place] where he is a  and was in [place] for vacation when he developed fevers, chills and fatigue. States was COVID negative. Notably history positive for a fib but not previously on AC. Unclear if prior bleed history given transfer and vascular neurology acting as a consultant service. Recommend that LI heparin be DC'ed and Eliquis 5 mg BID (renal impairment, but weight and age ok for full dose) be started as the patient has a CHADS-Vasc score of 5 and HAS-BLED score of 2.     Stroke Etiology: Not Applicable/Not Ischemic Stroke/Not TIA    STROKE DOCUMENTATION         NIH Scale: 0            Modified Lynwood Score: 0  [place] Coma Scale:    ABCD2 Score:    [id number] Score:   HAS -BLED Score:   ICH Score:   Hunt & Castillo Classification:       Assessment/Plan:     Diagnostic Results:      Brain Imaging/Vessel Imaging:    MRI Brain without contrast 8/8/2020  No acute infarction.  No intracranial hemorrhage.  There is patchy T2/FLAIR signal hyperintensity within the periventricular white matter, nonspecific but most suggestive of chronic microvascular ischemic changes.  No definite abnormal intra or extra-axial fluid collections.  No hydrocephalus.  No midline shift or mass effect.  Major intracranial T2 flow voids are present.     Paranasal sinuses and mastoid air cells are clear.  Globes are symmetric.  No marrow signal abnormality to suggest an infiltrative process.  Well-circumscribed fatty lesion noted within the subcutaneous soft tissues overlying the left anterior calvarium, likely a small lipoma.    Cardiac Evaluation:     TTE 8/8/2020  · Technically challenging portable study.  · Low  normal left ventricular systolic function. The estimated ejection fraction is 50-55%.  · Grade III (severe) left ventricular diastolic dysfunction consistent with restrictive physiology.  · Mild mitral regurgitation.  · Normal right ventricular systolic function.  · Severe left atrial enlargement.  · Small pericardial effusion.       Interventions: None    Complications: None    Disposition:     Final Active Diagnoses:    Diagnosis Date Noted POA    PRINCIPAL PROBLEM:  Community acquired bacterial pneumonia [J15.9] 08/08/2020 Yes    TIA (transient ischemic attack) [G45.9] 08/08/2020 Yes    Essential hypertension [I10] 08/08/2020 Yes    Type 2 diabetes mellitus [E11.9] 08/08/2020 Yes    Chronic atrial fibrillation [I48.20] 08/08/2020 Yes      Problems Resolved During this Admission:    Diagnosis Date Noted Date Resolved POA    Unresponsive [R41.89] 08/07/2020 08/08/2020 Yes     No new Assessment & Plan notes have been filed under this hospital service since the last note was generated.  Service: Vascular Neurology      Recommendations:     Post-discharge complication risks: None    Stroke Education given to: NA    Discharge Plan:  Anticoagulant: Would recommend Eliquis 5 mg BID for Afib history and FELICIANO-Vasc 5, HAS-BLED 2 score    Patient Instructions:   No discharge procedures on file.    Medications:  Reconciled Home Medications:      Medication List      START taking these medications    amiodarone 200 MG Tab  Commonly known as: PACERONE  Take 1 tablet (200 mg total) by mouth once daily.  Start taking on: August 9, 2020     apixaban 5 mg Tab  Commonly known as: ELIQUIS  Take 1 tablet (5 mg total) by mouth 2 (two) times daily.     atorvastatin 80 MG tablet  Commonly known as: LIPITOR  Take 1 tablet (80 mg total) by mouth once daily.  Start taking on: August 9, 2020     carvediloL 25 MG tablet  Commonly known as: COREG  Take 1 tablet (25 mg total) by mouth 2 (two) times daily.     famotidine 20 MG  tablet  Commonly known as: PEPCID  Take 1 tablet (20 mg total) by mouth 2 (two) times daily.     hydrALAZINE 25 MG tablet  Commonly known as: APRESOLINE  Take 1 tablet (25 mg total) by mouth every 8 (eight) hours.     insulin aspart U-100 100 unit/mL (3 mL) Inpn pen  Commonly known as: NovoLOG  Inject 10 Units into the skin 3 (three) times daily.     insulin detemir U-100 100 unit/mL (3 mL) Inpn pen  Commonly known as: LEVEMIR FLEXTOUCH  Inject 50 Units into the skin once daily.  Start taking on: August 9, 2020     PIPERACILLIN-TAZOBACTAM 4.5G/100ML SODIUM CHLORIDE 0.9%-READY TO MIX  Inject 100 mLs (4.5 g total) into the vein every 8 (eight) hours.     silodosin 8 mg Cap capsule  Commonly known as: RAPAFLO  Take 1 capsule (8 mg total) by mouth once daily.  Start taking on: August 9, 2020        CONTINUE taking these medications    calcipotriene 0.005 % cream  Commonly known as: DOVONOX  Apply topically 2 (two) times daily.     clindamycin 1 % lotion  Commonly known as: CLEOCIN T  Apply topically 2 (two) times daily.     clobetasol 0.05% 0.05 % Oint  Commonly known as: TEMOVATE  Apply topically 2 (two) times daily.     latanoprost 0.005 % ophthalmic solution  Place 1 drop into both eyes every evening.     tacrolimus 0.1 % ointment  Commonly known as: PROTOPIC  Apply topically 2 (two) times daily.            Britta Guerra DO  [facility] Stroke [place]  Department of Vascular Neurology   [facility]

## 2020-08-08 NOTE — PLAN OF CARE
Pt admitted to unit last night @2315. Neuro exam intact. GCS 15. Afebrile. Transitioned from bipap to 3L NC. Diabetic diet. Urinal at bedside. Pink tinged urine. Skin tear to left AC. Heparin gtt started @12 u/hr. Pan cultures sent. Sputum culture unable to send due to nonproductive cough. CXR. Bilateral US legs done.

## 2020-08-08 NOTE — PROGRESS NOTES
Pharmacokinetic Monitoring: IV Vancomycin and Zosyn (see bottom of note for Zosyn)    Vancomycin assessment/Plan:    - Continuing vancomycin from outside facility; previously on 1250 mg Q18H, last dose unknown  - Random level drawn at 09:30 today is 13 mcg/mL  - SCr elevated on admission, baseline is unknown  - Schedule vancomycin 1500 mg Q24H  - Draw trough prior to third dose on 8/10 at 12:15; earlier if renal function worsens/changes    Zosyn Renal Dose Adjustment:    - Piperacillin-tazobactam 4.5 gm Q12H will be adjusted to 4.5 gm Q8H infused over 4 hours for CrCl >20 mL/min to avoid under-dosing.      Pharmacy will continue to follow and monitor vancomycin. Please contact pharmacy at extension [phone] with any questions regarding this assessment.     Thank you for the consult,   Kinjal Wasserman, PharmD, [facility]  Neurocritical Care Pharmacist  [id number]       Patient brief summary:  Amadeo Stevenson is a 69 y.o. male initiated on antimicrobial therapy with IV Vancomycin and Zosyn for treatment of suspected lower respiratory infection    Drug Allergies:   Review of patient's allergies indicates:  Not on File    Actual Body Weight:   121.8 kg    Renal Function:   Estimated Creatinine Clearance: 42.8 mL/min (A) (based on SCr of 2.1 mg/dL (H)).,     Dialysis Method (if applicable):  N/A      Vital Signs (Most Recent):  Temp: 98 °F (36.7 °C) (08/08/20 0305)  Pulse: 70 (08/08/20 1107)  Resp: 15 (08/08/20 1107)  BP: (!) 161/74 (08/08/20 1000)  SpO2: 96 % (08/08/20 1107)   Vital Signs (72h Range):  Temp:  [98 °F (36.7 °C)-98.3 °F (36.8 °C)]   Pulse:  [55-96]   Resp:  [12-23]   BP: (158-195)/(73-88)   SpO2:  [93 %-99 %]      Recent Labs   Lab 08/07/20  0414   CREATININE 2.1*

## 2020-08-08 NOTE — CONSULTS
[facility]  Vascular Neurology  Comprehensive Stroke Center  Consult Note    Inpatient consult to Vascular (Stroke) Neurology  Consult performed by: Sharmila Canales NP  Consult ordered by: Shamir Lovell NP        Assessment/Plan:     Patient is a 69 y.o. year old male with:    * Community acquired bacterial pneumonia  ABX per primary team      Essential hypertension  Stroke risk factor  Normotensive    Type 2 diabetes mellitus  Stroke risk factor  HgB A1C 7.9  SSI    Chronic atrial fibrillation  stroke risk factor  Rate control with amio  On heparin drip but will need oral anticoagulation     TIA (transient ischemic attack)  68 y/o male being treated for PNA and had epsidoe that lasted a few hours where he was totally unresponsive. He was started on heparin drip and transfer to [facility]. Supposedly TIA but the symptomology does not match up with this    Antithrombotics: heparin drip    Statins: Lipitor 40 mg    Aggressive risk factor modification: HTN, DM, HLD, Diet, Exercise, Obesity, A-Fib, CHF, CKD     Rehab efforts: The patient has been evaluated by a stroke team provider and the therapy needs have been fully considered based off the presenting complaints and exam findings. The following therapy evaluations are needed: None    Diagnostics ordered/pending: MRI head without contrast to assess brain parenchyma, TTE to assess cardiac function/status     VTE prophylaxis: Mechanical prophylaxis: Place SCDs  None: Reason for No Pharmacological VTE Prophylaxis: Currently on anticoagulation    BP parameters: unsure if ischemic event normotensive            STROKE DOCUMENTATION          NIH Scale:  1a. Level of Consciousness: 0-->Alert, keenly responsive  1b. LOC Questions: 0-->Answers both questions correctly  1c. LOC Commands: 0-->Performs both tasks correctly  2. Best Gaze: 0-->Normal  3. Visual: 0-->No visual loss  4. Facial Palsy: 0-->Normal symmetrical movements  5a. Motor Arm, Left:  0-->No drift, limb holds 90 (or 45) degrees for full 10 secs  5b. Motor Arm, Right: 0-->No drift, limb holds 90 (or 45) degrees for full 10 secs  6a. Motor Leg, Left: 0-->No drift, leg holds 30 degree position for full 5 secs  6b. Motor Leg, Right: 0-->No drift, leg holds 30 degree position for full 5 secs  7. Limb Ataxia: 0-->Absent  8. Sensory: 0-->Normal, no sensory loss  9. Best Language: 0-->No aphasia, normal  10. Dysarthria: 0-->Normal  11. Extinction and Inattention (formerly Neglect): 0-->No abnormality  Total (NIH Stroke Scale): 0    Modified Kingfisher Score: 0  Inez Coma Scale:15   ABCD2 Score:    [id number] Score:   HAS -BLED Score:   ICH Score:   Hunt & Castillo Classification:       Thrombolysis Candidate? No, Out of window , Strong suspicion for stroke mimic or alternative diagnosis     Delays to Thrombolysis?  No    Interventional Revascularization Candidate?   Is the patient eligible for mechanical endovascular reperfusion (SHARMAINE)?  No; No significant neurological deficit      Hemorrhagic change of an Ischemic Stroke: Does this patient have an ischemic stroke with hemorrhagic changes? No     Subjective:     History of Present Illness:  70 y/o male who was admitted to [facility] in [place] for PNA RUL 3 days ago. The chart that was sent with the patient is missing many records so unable to piece together events leading up to the event were patient became unresponsive. He states that he could hear everything but could not move. CT and CTA head done with no acute findings. After several hours symptoms resolved and patient with no focal neuro deficits. He was started on heparin drip moved to ICU and transfer request was made to transfer to [facility] for possible TIA?.   Upon evaluation patient with no focal neuro deficits.   Patient with afib, HTN, DM, CKD, CHF. Patient on ABX of PNA.  Not really sure patient had a TIA but possible given afib and not on anticoagulation. Also  symptomology does not correlate with TIA with total unresponsiveness.         Past Medical History:   Diagnosis Date    CHF (congestive heart failure), NYHA class II, acute, combined     Chronic a-fib     CKD (chronic kidney disease)     Diabetes mellitus type 2, uncontrolled, with complications     Gout     HTN (hypertension), benign      History reviewed. No pertinent surgical history.  History reviewed. No pertinent family history.  Social History     Tobacco Use    Smoking status: Not on file   Substance Use Topics    Alcohol use: Not on file    Drug use: Not on file     Review of patient's allergies indicates:  Not on File    Medications: I have reviewed the current medication administration record.    Medications Prior to Admission   Medication Sig Dispense Refill Last Dose    calcipotriene (DOVONOX) 0.005 % cream Apply topically 2 (two) times daily.       clindamycin (CLEOCIN T) 1 % lotion Apply topically 2 (two) times daily.       clobetasol 0.05% (TEMOVATE) 0.05 % Oint Apply topically 2 (two) times daily.       latanoprost 0.005 % ophthalmic solution Place 1 drop into both eyes every evening.       tacrolimus (PROTOPIC) 0.1 % ointment Apply topically 2 (two) times daily.          Review of Systems   Constitutional: Negative for chills and fever.   HENT: Negative for ear discharge and ear pain.    Eyes: Negative for pain and itching.   Respiratory: Positive for cough and shortness of breath.    Cardiovascular: Negative for chest pain and leg swelling.   Gastrointestinal: Negative for abdominal distention and abdominal pain.   Genitourinary: Negative for dysuria.   Musculoskeletal: Positive for arthralgias. Negative for back pain.   Skin: Negative for rash and wound.   Neurological: Negative for speech difficulty and weakness.     Objective:     Vital Signs (Most Recent):  Pulse: 72 (08/08/20 0145)  Resp: (!) 23 (08/08/20 0028)  BP: (!) 190/80 (08/08/20 0145)  SpO2: 97 % (08/08/20 0028)    Vital  Signs Range (Last 24H):  Pulse:  [72-96]   Resp:  [14-23]   BP: (158-190)/(80-85)   SpO2:  [93 %-98 %]     Physical Exam  Vitals signs and nursing note reviewed.   Constitutional:       Appearance: Normal appearance.      Comments: Obese     HENT:      Head: Normocephalic and atraumatic.   Eyes:      Extraocular Movements: Extraocular movements intact.      Pupils: Pupils are equal, round, and reactive to light.   Cardiovascular:      Rate and Rhythm: Normal rate. Rhythm irregular.   Abdominal:      General: Abdomen is flat.      Palpations: Abdomen is soft.   Skin:     General: Skin is warm and dry.   Neurological:      General: No focal deficit present.      Mental Status: He is alert and oriented to person, place, and time.         Neurological Exam:   LOC: alert  Attention Span: Good   Language: No aphasia  Articulation: No dysarthria  Orientation: Person, Place, Time   Visual Fields: Full  EOM (CN III, IV, VI): Full/intact  Pupils (CN II, III): PERRL  Facial Sensation (CN V): Normal  Facial Movement (CN VII): Symmetric facial expression    Gag Reflex: present  Reflexes: flexor plantar responses bilaterally  Motor: Arm left  Normal 5/5  Leg left  Normal 5/5  Arm right  Normal 5/5  Leg right Normal 5/5  Cebellar: No evidence of appendicular or axial ataxia  Sensation: Intact to light touch, temperature and vibration  Tone: Normal tone throughout      Laboratory:  CMP:   Recent Labs   Lab 08/07/20  2345   CALCIUM 8.7   ALBUMIN 2.5*   PROT 6.9      K 4.6   CO2 26      BUN 58*   CREATININE 2.1*   ALKPHOS 89   ALT 26   AST 25   BILITOT 0.7     CBC:   Recent Labs   Lab 08/08/20  0115   WBC 11.76  11.76   RBC 3.78*  3.78*   HGB 10.2*  10.2*   HCT 32.1*  32.1*     152   MCV 85  85   MCH 27.0  27.0   MCHC 31.8*  31.8*     Lipid Panel:   Recent Labs   Lab 08/08/20  0115   CHOL 89*   LDLCALC 50.6*   HDL 25*   TRIG 67     Coagulation:   Recent Labs   Lab 08/08/20  0115   INR 1.1   APTT 34.2*   34.2*     Hgb A1C:   Recent Labs   Lab 08/08/20 0115   HGBA1C 7.9*     TSH:   Recent Labs   Lab 08/08/20 0115   TSH 0.370*       Diagnostic Results:      Brain imaging:      Vessel Imaging:      Cardiac Evaluation:         Sharmila Canales NP  [facility]  Department of Vascular Neurology   [facility]

## 2020-08-08 NOTE — PT/OT/SLP EVAL
Physical Therapy Evaluation    Patient Name:  Amadeo Stevenson   MRN:  [id number]    Recommendations:     Discharge Recommendations:  home health PT   Discharge Equipment Recommendations: none   Barriers to discharge: None    Assessment:     Amadeo Stevenson is a 69 y.o. male admitted with a medical diagnosis of Community acquired bacterial pneumonia.  He presents with the following impairments/functional limitations:  impaired endurance, gait instability, impaired cognition, impaired functional mobilty, impaired balance. Noted delayed responses to verbal cues and questions regarding history. Also patient inconsistent with some history reporting, reports that he plays basketball but also c/o rotator cuff injuries and on supplemental O2 at home. Ambulated 14' with R HHA and contact guard assist. Anticipate that he will be safe to return home with spouse supervision.     Rehab Prognosis: Good; patient would benefit from acute skilled PT services to address these deficits and reach maximum level of function.    Recent Surgery: * No surgery found *      Plan:     During this hospitalization, patient to be seen 4 x/week to address the identified rehab impairments via gait training, therapeutic activities, therapeutic exercises, neuromuscular re-education and progress toward the following goals:    · Plan of Care Expires:  09/07/20    Subjective     Chief Complaint: none  Patient/Family Comments/goals: return home  Pain/Comfort:  · Pain Rating 1: 0/10    Patients cultural, spiritual, Spiritism conflicts given the current situation: no    Living Environment:  The patient lives with his wife in a H, 0 [address]. 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 R handed. Reports enjoying playing outdoor sports like basketball.   Prior to admission, patients level of function was modified independent with SPC for mobility, denies recent falls.  Equipment used at home: cane, straight.  DME owned (not currently used): none.  Upon discharge, patient will have  assistance from wife.    Objective:     Communicated with RN prior to session.  Patient found HOB elevated with 3Lsupplemental O2, blood pressure cuff, peripheral IV, pulse ox (continuous), SCD, telemetry  upon PT entry to room.    General Precautions: Standard, fall   Orthopedic Precautions:N/A   Braces: N/A     Exams:    Cognitive Exam  Patient is A&O x4 and follows 100% of one -step commands, initially patient with eyes closed while supine during subjective, delayed responses to questions   Fine Motor Coordination   -       Unable to follow cues for rapid alternating toe taps in spite of verbal/manual/visual cues     Postural Exam Patient presented with the following abnormalities:    -       Rounded shoulders  -       Forward head  -       Kyphosis  -       Posterior pelvic tilt   Sensation    -       Light touch intact SILVESTRE LE   Skin Integrity/Edema     -       Skin integrity: visibly intact  -       Edema: NA   R LE ROM WFL   R LE Strength 4/5 hip flexion, knee ext/flex, and ankle DF/PF   L LE ROM WFL   L LE Strength  4/5 hip flexion, knee ext/flex, and ankle DF/PF       Balance   Static Sitting supervision assistance    Dynamic Sitting supervision assistance    Static Standing stand by assistance    Dynamic Standing       stand by assistance           Functional Mobility:    Bed Mobility  Supine to Sit on the R side:  minimum assistance HHA  Sit to supine: stand by assistance    Transfers Sit to Stand:  stand by assistance    Gait  Gait Distance: 12 ft with R HHA  Assistance Level: contact guard assist   Description: short shuffling strides, kyphotic posture, wide KARLA, decreased foot clearance    Gait training: cues for navigating, assist for line management, cues for postural and reciprocal strides          Therapeutic Activities and Exercises:   Patient safe to ambulate with RN assist of 1 person, whiteboard updated, RN alerted.   Patient encouraged to sit up in chair 3x/day, ambulate with RN assist to  prevent deconditioning- verbalized understanding.   Patient educated on role of therapy, goals of session, benefits of out of bed mobility. Patient agreeable to mobilize with therapy.  Discussed PT plan of care during hospitalization. Patient educated that they need to call for assistance to mobilize out of bed. Whiteboard updated as appropriate. Patient educated on how their diagnosis impacts their mobility within PT scope of practice.     AM-PAC 6 CLICK MOBILITY  Total Score:21     Patient left HOB elevated with all lines intact, call button in reach and RN notified.    GOALS:   Multidisciplinary Problems     Physical Therapy Goals        Problem: Physical Therapy Goal    Goal Priority Disciplines Outcome Goal Variances Interventions   Physical Therapy Goal     PT, PT/OT Ongoing, Progressing     Description: Goals to be met by:      Patient will increase functional independence with mobility by performin. Supine to sit with Stand-by Assistance  2. Sit to supine with Stand-by Assistance  3. Sit to stand transfer with Stand-by Assistance  4. Gait  x 100 feet with Supervision using Single-point Cane .   5. Lower extremity exercise program x15 reps per handout, with independence to improve strength and activity tolerance.   6. Ambulate 2 minutes with stand by assistance with no AD while performing dynamic head turns, sudden change in speed and direction, withstand dynamic perturbations with no loss of balance.                          History:     Past Medical History:   Diagnosis Date    CHF (congestive heart failure), NYHA class II, acute, combined     Chronic a-fib     CKD (chronic kidney disease)     Diabetes mellitus type 2, uncontrolled, with complications     Gout     HTN (hypertension), benign        History reviewed. No pertinent surgical history.    Time Tracking:     PT Received On: 20  PT Start Time: 1114     PT Stop Time: 1132  PT Total Time (min): 18 min     Billable Minutes:  Evaluation 10 and Gait Training 8 (co-eval with OT)      Yee Murray, PT  08/08/2020

## 2020-08-09 NOTE — ASSESSMENT & PLAN NOTE
Currently Rate Controlled Afib  Continue Amiodarone 200 mg PO daily  Started Eliquis   Will need to start PO regimen prior to discharge  Maintain K>4 and Mg>2

## 2020-08-09 NOTE — PROGRESS NOTES
[facility]  Vascular Neurology  Comprehensive Stroke Center  Progress Note    Assessment/Plan:     * Community acquired bacterial pneumonia  ABX per primary team      Chronic atrial fibrillation  stroke risk factor  Rate control with amio  Begin Eliquis 5 mg BID     Type 2 diabetes mellitus  Stroke risk factor  HgB A1C 7.9    Essential hypertension  Stroke risk factor  Normotensive    TIA (transient ischemic attack)  70 y/o male being treated for PNA and had epsidoe that lasted a few hours where he was totally unresponsive. He was started on heparin drip and transfer to [facility]. Supposedly TIA but the symptomology does not match up with this. MRI reviewed- no evidence of intracranial pathology.     Antithrombotics: Eliquis 5 mg BID for Atrial fibrillation      Statins: Lipitor 40 mg    Aggressive risk factor modification: HTN, DM, HLD, Diet, Exercise, Obesity, A-Fib, CHF, CKD     Rehab efforts: The patient has been evaluated by a stroke team provider and the therapy needs have been fully considered based off the presenting complaints and exam findings. The following therapy evaluations are needed: None    Diagnostics ordered/pending: None     VTE prophylaxis: Mechanical prophylaxis: Place SCDs  None: Reason for No Pharmacological VTE Prophylaxis: Currently on anticoagulation    BP parameters: unsure if ischemic event normotensive    Thank you for involving us in the care of this patient. Vascular Neurology will sign off at this time.          Patient accepted as a transfer to [facility] for evaluation of TIA- patient described episode of inability to respond- though aware and conscious of others around him. No control of his entire body or voice for unknown amount of time. No new medications given prior to this episode. Vascular neurology consulted for possible TIA upon arrival. The patient was transferred from [place] with LI heparin gtt. CT/CTA negative from OSH- reviewed by primary team. MRI at  [facility] negative for acute intracranial process- reviewed by vascular neurology. Patient without evidence of TIA or stroke on exam- no focal deficits- NIHSS of 0. As such, vascular neurology has no recommendations for this patient from a stroke standpoint as this was effectively ruled out via imaging. Patient remains on vanc and zosyn for CAP- was originally admitted to hospital for such. On home oxygen of 2 L's at baseline per patient, but states for his CHF (?). Denies smoking history or history of COPD. Apparently from [place] where he is a  and was in [place] for vacation when he developed fevers, chills and fatigue. States was COVID negative. Notably history positive for a fib but not previously on AC. Unclear if prior bleed history given transfer and vascular neurology acting as a consultant service. Recommend that LI heparin be DC'ed and Eliquis 5 mg BID be started as the patient has a CHADS-Vasc score of 5 and HAS-BLED score of 2.     On 8/9/2020- patient to be stepped down from NCC team. Patient on Day 4/5 of Vanc/Zosyn for CAP (could possibly be de-escalated to typical CAP coverage though with persistent increasing leukocytosis). Patient's MRI reviewed with no evidence of acute intracranial abnormalities. Evidence on chronic microvascular changes. Increasing leukocytosis. Workup per primary with amylase, lipase, and D-dimer. Would recommend admission to  for continued CAP therapy and leukocytosis workup.     STROKE DOCUMENTATION        NIH Scale:  1a. Level of Consciousness: 0-->Alert, keenly responsive  1b. LOC Questions: 0-->Answers both questions correctly  1c. LOC Commands: 0-->Performs both tasks correctly  2. Best Gaze: 0-->Normal  3. Visual: 0-->No visual loss  4. Facial Palsy: 0-->Normal symmetrical movements  5a. Motor Arm, Left: 0-->No drift, limb holds 90 (or 45) degrees for full 10 secs  5b. Motor Arm, Right: 0-->No drift, limb holds 90 (or 45) degrees for full 10 secs  6a. Motor Leg, Left:  0-->No drift, leg holds 30 degree position for full 5 secs  6b. Motor Leg, Right: 0-->No drift, leg holds 30 degree position for full 5 secs  7. Limb Ataxia: 0-->Absent  8. Sensory: 0-->Normal, no sensory loss  9. Best Language: 0-->No aphasia, normal  10. Dysarthria: 0-->Normal  11. Extinction and Inattention (formerly Neglect): 0-->No abnormality  Total (NIH Stroke Scale): 0       Modified Gregory Score: 0  [place] Coma Scale:    ABCD2 Score:    [id number] Score:   HAS -BLED Score:   ICH Score:   Hunt & Castillo Classification:      Hemorrhagic change of an Ischemic Stroke: Does this patient have an ischemic stroke with hemorrhagic changes? No     Neurologic Chief Complaint: Unresponsive- describes episode of being awake and aware of those around him but unable to move arms or legs, speak    Subjective:     Interval History: Patient is seen for follow-up neurological assessment and treatment recommendations:     NAEON. NIHSS remains 0. Continuing antibiotics per primary for CAP. No diarrhea. No abdominal pain or localized pain. Increasing leukocytosis.     HPI, Past Medical, Family, and Social History remains the same as documented in the initial encounter.     Review of Systems   Musculoskeletal: Negative for arthralgias, back pain and myalgias.   Skin: Negative for rash and wound.   Neurological: Negative for dizziness and headaches.   Psychiatric/Behavioral: Negative for agitation, confusion and decreased concentration. Hallucinations:    The patient is not nervous/anxious.      Scheduled Meds:   albuterol-ipratropium  3 mL Nebulization Q4H    amiodarone  200 mg Oral Daily    apixaban  5 mg Oral BID    aspirin  81 mg Oral Daily    atorvastatin  80 mg Oral Daily    carvediloL  25 mg Oral BID    famotidine  20 mg Oral BID    hydrALAZINE  100 mg Oral Q8H    insulin detemir U-100  10 Units Subcutaneous BID    piperacillin-tazobactam (ZOSYN) IVPB  4.5 g Intravenous Q8H    polyethylene glycol  17 g Oral Daily     senna-docusate 8.6-50 mg  1 tablet Oral BID    silodosin  8 mg Oral Daily    vancomycin (VANCOCIN) IVPB  1,500 mg Intravenous Q24H     Continuous Infusions:  PRN Meds:acetaminophen, dextrose 50%, dextrose 50%, glucagon (human recombinant), glucose, glucose, insulin aspart U-100, magnesium oxide, magnesium oxide, ondansetron, potassium chloride, potassium chloride, potassium chloride, potassium, sodium phosphates, potassium, sodium phosphates, potassium, sodium phosphates, senna-docusate 8.6-50 mg, sodium chloride 0.9%, Pharmacy to dose Vancomycin consult **AND** vancomycin - pharmacy to dose    Objective:     Vital Signs (Most Recent):  Temp: 97.9 °F (36.6 °C) (08/09/20 1512)  Pulse: 76 (08/09/20 1512)  Resp: 20 (08/09/20 1512)  BP: (!) 151/76 (08/09/20 1512)  SpO2: (!) 94 % (08/09/20 1512)  BP Location: Left arm    Vital Signs Range (Last 24H):  Temp:  [97.8 °F (36.6 °C)-98.4 °F (36.9 °C)]   Pulse:  []   Resp:  [16-41]   BP: (134-192)/()   SpO2:  [94 %-100 %]   BP Location: Left arm    Physical Exam  Vitals signs and nursing note reviewed.   Constitutional:       General: He is not in acute distress.     Appearance: Normal appearance. He is obese. He is not ill-appearing.   HENT:      Head: Normocephalic and atraumatic.   Eyes:      General: No scleral icterus.     Pupils: Pupils are equal, round, and reactive to light.   Cardiovascular:      Rate and Rhythm: Normal rate.      Pulses: Normal pulses.           Radial pulses are 2+ on the right side and 2+ on the left side.   Pulmonary:      Effort: Pulmonary effort is normal. No respiratory distress.   Abdominal:      General: There is no distension.      Tenderness: There is no abdominal tenderness. There is no guarding.   Musculoskeletal:         General: No tenderness or deformity.      Right lower leg: Edema present.      Left lower leg: Edema present.   Skin:     General: Skin is warm and dry.      Coloration: Skin is not jaundiced.       Comments: No evidence of foot ulcers or infection in intertriginous areas of bilateral feet   Neurological:      General: No focal deficit present.      Mental Status: He is alert and oriented to person, place, and time. Mental status is at baseline.      Cranial Nerves: No cranial nerve deficit.      Sensory: No sensory deficit.      Motor: No weakness.      Coordination: Coordination normal.   Psychiatric:         Mood and Affect: Mood normal.         Behavior: Behavior normal.         Neurological Exam:   LOC: alert  Attention Span: Good   Language: No aphasia  Articulation: No dysarthria  Orientation: Person, Place, Time   Visual Fields: Full  EOM (CN III, IV, VI): Full/intact  Pupils (CN II, III): PERRL  Facial Sensation (CN V): Normal  Facial Movement (CN VII): Symmetric facial expression    Motor: Arm left  Normal 5/5  Leg left  Normal 5/5  Arm right  Normal 5/5  Leg right Normal 5/5  Sensation: Intact to light touch, temperature and vibration  Tone: Normal tone throughout    Laboratory:  CBC:   Recent Labs   Lab 08/09/20  0409   WBC 19.45*   RBC 3.95*   HGB 10.6*   HCT 33.5*      MCV 85   MCH 26.8*   MCHC 31.6*     Lipid Panel:   Recent Labs   Lab 08/08/20  0115   CHOL 89*   LDLCALC 50.6*   HDL 25*   TRIG 67     Hgb A1C:   Recent Labs   Lab 08/08/20  0115   HGBA1C 7.9*     TSH:   Recent Labs   Lab 08/08/20  0115   TSH 0.370*       Diagnostic Results     Brain Imaging/Vessel Imaging     MRI Brain without Contrast 8/8/2020  No acute intracranial MR abnormalities.  Images chronic microvascular ischemic changes.    Cardiac Imaging     TTE 8/9/2020  · Technically challenging portable study.  · Low normal left ventricular systolic function. The estimated ejection fraction is 50-55%.  · Grade III (severe) left ventricular diastolic dysfunction consistent with restrictive physiology.  · Mild mitral regurgitation.  · Normal right ventricular systolic function.  · Severe left atrial enlargement.  · Small  pericardial effusion.      Britta Guerra DO  [facility] Stroke Center  Department of Vascular Neurology   [facility]

## 2020-08-09 NOTE — PROGRESS NOTES
"[facility]  Neurocritical Care  Progress Note    Admit Date: 8/7/2020  Service Date: 08/09/2020  Length of Stay: 2    Subjective:     Chief Complaint: Community acquired bacterial pneumonia    History of Present Illness: 68 y/o M with hx of HTN, Atrial Fibrillation, HLD, DM2, CKD, Gout, HFpEF who was admitted to OSH for RUL Pneumonia 3 days ago.   Today patient had episode of AMS, unresponsiveness. He states, "I could listen to everyone but I couldn't move".  CT Head and CTA of Head were negative at OSH (CDs and Reports in Chart). After several hours, symptoms and deficits resolved spontaneously.   He was treated for TIA with Heparin gtt and [place] [facility] [place].    Upon arrival, patient alert, oriented x4 and in no acute distress.  GCS15, No neurological Deficits     Hospital Course: 08/08/2020 Admitted to [facility]  08/09/2020: Stable for stepdown. Workup leukocytosis.       Interval History:  NAEON. MRI and EEG negative for any acute intracranial process. Pt with leukocytosis, no fever. Ordered amylase, lipase, and d-dimer to look for non-infectious reason for elevated wbc    Review of Systems   Constitutional: Negative for unexpected weight change.   HENT: Negative for voice change.    Eyes: Negative for visual disturbance.   Respiratory: Negative for wheezing.    Cardiovascular: Negative for chest pain, palpitations and leg swelling.   Gastrointestinal: Negative for abdominal distention.   Genitourinary: Negative for difficulty urinating.   Musculoskeletal: Negative for neck pain.   Neurological: Positive for weakness.   Hematological: Does not bruise/bleed easily.   Psychiatric/Behavioral: Negative for agitation.       Objective:     Vitals:  Temp: 98.2 °F (36.8 °C)  Pulse: 90  Rhythm: sinus bradycardia  BP: (!) 192/88  MAP (mmHg): 119  Resp: 17  SpO2: 98 %  O2 Device (Oxygen Therapy): nasal cannula    Temp  Min: 97.8 °F (36.6 °C)  Max: 98.4 °F (36.9 °C)  Pulse  Min: 53  " Max: 104  BP  Min: 136/104  Max: 192/88  MAP (mmHg)  Min: 95  Max: 122  Resp  Min: 15  Max: 29  SpO2  Min: 95 %  Max: 99 %  Oxygen Concentration (%)  Min: 3  Max: 3    08/08 0701 - 08/09 0700  In: 1020.1 [P.O.:200; I.V.:270.1]  Out: 2025 [Urine:2025]   Unmeasured Output  Urine Occurrence: 1  Stool Occurrence: 0       Physical Exam  Constitutional:       General: He is not in acute distress.     Appearance: Normal appearance. He is obese.   HENT:      Head: Normocephalic and atraumatic.      Right Ear: External ear normal.      Left Ear: External ear normal.      Nose: Nose normal. No congestion.      Mouth/Throat:      Mouth: Mucous membranes are moist.      Pharynx: Oropharynx is clear.   Eyes:      General: No scleral icterus.     Extraocular Movements: Extraocular movements intact.      Conjunctiva/sclera: Conjunctivae normal.      Pupils: Pupils are equal, round, and reactive to light.   Neck:      Musculoskeletal: Normal range of motion and neck supple.   Cardiovascular:      Rate and Rhythm: Rhythm irregular.   Pulmonary:      Effort: Pulmonary effort is normal.      Breath sounds: Normal breath sounds.   Abdominal:      Palpations: Abdomen is soft.      Comments: Obese    Musculoskeletal: Normal range of motion.   Skin:     General: Skin is warm and dry.      Capillary Refill: Capillary refill takes less than 2 seconds.   Neurological:      General: No focal deficit present.      Mental Status: He is alert.      GCS: GCS eye subscore is 4. GCS verbal subscore is 5. GCS motor subscore is 6.      Cranial Nerves: Cranial nerves are intact.      Sensory: Sensation is intact.      Motor: Motor function is intact.      Coordination: Coordination is intact. Finger-Nose-Finger Test normal.      Comments: Pt is awake, alert, and oriented to person, place, time, and situation   Recent and remote memory slightly impaired, unable to remember all words given within 3 mins   Cranial nerves examined and are grossly  intact  MAXWELL spontaneously   Sensation to light touch intact throughout    Psychiatric:         Mood and Affect: Mood normal.           Medications:  Continuous Scheduledalbuterol-ipratropium, 3 mL, Q4H  amiodarone, 200 mg, Daily  apixaban, 5 mg, BID  aspirin, 81 mg, Daily  atorvastatin, 80 mg, Daily  carvediloL, 25 mg, BID  famotidine, 20 mg, BID  hydrALAZINE, 100 mg, Q8H  hydrALAZINE, 75 mg, Once  insulin aspart U-100, 10 Units, TIDWM  insulin detemir U-100, 50 Units, Daily  piperacillin-tazobactam (ZOSYN) IVPB, 4.5 g, Q8H  polyethylene glycol, 17 g, Daily  senna-docusate 8.6-50 mg, 1 tablet, BID  silodosin, 8 mg, Daily  vancomycin (VANCOCIN) IVPB, 1,500 mg, Q24H    PRNacetaminophen, 650 mg, Q4H PRN  dextrose 50%, 12.5 g, PRN  dextrose 50%, 25 g, PRN  glucagon (human recombinant), 1 mg, PRN  glucose, 16 g, PRN  glucose, 24 g, PRN  insulin aspart U-100, 1-10 Units, QID (AC + HS) PRN  magnesium oxide, 800 mg, PRN  magnesium oxide, 800 mg, PRN  ondansetron, 4 mg, Q6H PRN  potassium chloride, 40 mEq, PRN  potassium chloride, 40 mEq, PRN  potassium chloride, 60 mEq, PRN  potassium, sodium phosphates, 2 packet, PRN  potassium, sodium phosphates, 2 packet, PRN  potassium, sodium phosphates, 2 packet, PRN  senna-docusate 8.6-50 mg, 1 tablet, Daily PRN  sodium chloride 0.9%, 10 mL, PRN  vancomycin - pharmacy to dose, , pharmacy to manage frequency      Today I personally reviewed pertinent laboratory results, notably:leukocystosis     Diet  Diet diabetic [facility]; 2000 Calorie; Cardiac (Low Na/Chol)  Diet diabetic [facility]; 2000 Calorie; Cardiac (Low Na/Chol)        Assessment/Plan:     Neuro  TIA (transient ischemic attack)  68 y/o M with extensive medical history who presents from OSH for TIA while being treated for PNA  CTH and CTA negative at OSH. Neurologically intact on transfer and [place] [facility]    [facility]  -Neuro Checks q 2 hours  -MAP>65, SBP <160  -Continue Vancomycin / Zosyn for Community  Acquired Pneumonia  -Rate Controlled Atrial Fibrillation  -Resume Heparin gtt (Low Intensity)  -Cardiac/Diabetic Diet  -Vascular Neurology Consult   -DVT PPX: Heparin gtt  -GI PPX: H2B  -PT/OT    Pulmonary  * Community acquired bacterial pneumonia  IV Vancomycin / Zosyn at OSH  Will continue therapy at this time  Albuterol Nebulizers q4 hours  Maintain O2 Sat >88%  Currently on NC 2Liters  CXR PRN    Cardiac/Vascular  Chronic atrial fibrillation  Currently Rate Controlled Afib  Continue Amiodarone 200 mg PO daily  Started Eliquis   Will need to start PO regimen prior to discharge  Maintain K>4 and Mg>2    Essential hypertension  SBP<160  Coreg / Lasix / Amiodarone Resumed from Home Meds  Will hold ACE-I due to Renal Function    Endocrine  Type 2 diabetes mellitus  ISS          The patient is being Prophylaxed for:  Venous Thromboembolism with: Chemical  Stress Ulcer with: Not Applicable   Ventilator Pneumonia with: not applicable    Activity Orders          Diet diabetic [facility]; 2000 Calorie; Cardiac (Low Na/Chol): Diabetic starting at 08/08 0125        Full Code  Level II  Martha Meléndez NP  Neurocritical Care  [facility]

## 2020-08-09 NOTE — PLAN OF CARE
NATALIA faxed clinicals to the [facility] at [phone].    Rae Boyd LMSW  [facility]

## 2020-08-09 NOTE — PLAN OF CARE
Plan of care reviewed with patient. Pt arrived to room AAOx4. Neuro checks as ordered. Pt relaxing in med, eating dinner

## 2020-08-09 NOTE — PLAN OF CARE
POC reviewed with pt at 0500. Pt verbalized understanding. Questions and concerns addressed. No acute events overnight. Heparin gtt titrated up to 14u/kg/hr per nomogram. No bolus given per MD Billy. Pt progressing toward goals. Transfer orders in place. Will continue to monitor. See flowsheets for full assessment and VS info.

## 2020-08-09 NOTE — SUBJECTIVE & OBJECTIVE
Neurologic Chief Complaint: Unresponsive- describes episode of being awake and aware of those around him but unable to move arms or legs, speak    Subjective:     Interval History: Patient is seen for follow-up neurological assessment and treatment recommendations:     JAKE. NIHSS remains 0. Continuing antibiotics per primary for CAP. No diarrhea. No abdominal pain or localized pain. Increasing leukocytosis.     HPI, Past Medical, Family, and Social History remains the same as documented in the initial encounter.     Review of Systems   Musculoskeletal: Negative for arthralgias, back pain and myalgias.   Skin: Negative for rash and wound.   Neurological: Negative for dizziness and headaches.   Psychiatric/Behavioral: Negative for agitation, confusion and decreased concentration. Hallucinations:    The patient is not nervous/anxious.      Scheduled Meds:   albuterol-ipratropium  3 mL Nebulization Q4H    amiodarone  200 mg Oral Daily    apixaban  5 mg Oral BID    aspirin  81 mg Oral Daily    atorvastatin  80 mg Oral Daily    carvediloL  25 mg Oral BID    famotidine  20 mg Oral BID    hydrALAZINE  100 mg Oral Q8H    insulin detemir U-100  10 Units Subcutaneous BID    piperacillin-tazobactam (ZOSYN) IVPB  4.5 g Intravenous Q8H    polyethylene glycol  17 g Oral Daily    senna-docusate 8.6-50 mg  1 tablet Oral BID    silodosin  8 mg Oral Daily    vancomycin (VANCOCIN) IVPB  1,500 mg Intravenous Q24H     Continuous Infusions:  PRN Meds:acetaminophen, dextrose 50%, dextrose 50%, glucagon (human recombinant), glucose, glucose, insulin aspart U-100, magnesium oxide, magnesium oxide, ondansetron, potassium chloride, potassium chloride, potassium chloride, potassium, sodium phosphates, potassium, sodium phosphates, potassium, sodium phosphates, senna-docusate 8.6-50 mg, sodium chloride 0.9%, Pharmacy to dose Vancomycin consult **AND** vancomycin - pharmacy to dose    Objective:     Vital Signs (Most  Recent):  Temp: 97.9 °F (36.6 °C) (08/09/20 1512)  Pulse: 76 (08/09/20 1512)  Resp: 20 (08/09/20 1512)  BP: (!) 151/76 (08/09/20 1512)  SpO2: (!) 94 % (08/09/20 1512)  BP Location: Left arm    Vital Signs Range (Last 24H):  Temp:  [97.8 °F (36.6 °C)-98.4 °F (36.9 °C)]   Pulse:  []   Resp:  [16-41]   BP: (134-192)/()   SpO2:  [94 %-100 %]   BP Location: Left arm    Physical Exam  Vitals signs and nursing note reviewed.   Constitutional:       General: He is not in acute distress.     Appearance: Normal appearance. He is obese. He is not ill-appearing.   HENT:      Head: Normocephalic and atraumatic.   Eyes:      General: No scleral icterus.     Pupils: Pupils are equal, round, and reactive to light.   Cardiovascular:      Rate and Rhythm: Normal rate.      Pulses: Normal pulses.           Radial pulses are 2+ on the right side and 2+ on the left side.   Pulmonary:      Effort: Pulmonary effort is normal. No respiratory distress.   Abdominal:      General: There is no distension.      Tenderness: There is no abdominal tenderness. There is no guarding.   Musculoskeletal:         General: No tenderness or deformity.      Right lower leg: Edema present.      Left lower leg: Edema present.   Skin:     General: Skin is warm and dry.      Coloration: Skin is not jaundiced.      Comments: No evidence of foot ulcers or infection in intertriginous areas of bilateral feet   Neurological:      General: No focal deficit present.      Mental Status: He is alert and oriented to person, place, and time. Mental status is at baseline.      Cranial Nerves: No cranial nerve deficit.      Sensory: No sensory deficit.      Motor: No weakness.      Coordination: Coordination normal.   Psychiatric:         Mood and Affect: Mood normal.         Behavior: Behavior normal.         Neurological Exam:   LOC: alert  Attention Span: Good   Language: No aphasia  Articulation: No dysarthria  Orientation: Person, Place, Time   Visual  Fields: Full  EOM (CN III, IV, VI): Full/intact  Pupils (CN II, III): PERRL  Facial Sensation (CN V): Normal  Facial Movement (CN VII): Symmetric facial expression    Motor: Arm left  Normal 5/5  Leg left  Normal 5/5  Arm right  Normal 5/5  Leg right Normal 5/5  Sensation: Intact to light touch, temperature and vibration  Tone: Normal tone throughout    Laboratory:  CBC:   Recent Labs   Lab 08/09/20  0409   WBC 19.45*   RBC 3.95*   HGB 10.6*   HCT 33.5*      MCV 85   MCH 26.8*   MCHC 31.6*     Lipid Panel:   Recent Labs   Lab 08/08/20  0115   CHOL 89*   LDLCALC 50.6*   HDL 25*   TRIG 67     Hgb A1C:   Recent Labs   Lab 08/08/20  0115   HGBA1C 7.9*     TSH:   Recent Labs   Lab 08/08/20 0115   TSH 0.370*       Diagnostic Results     Brain Imaging/Vessel Imaging     MRI Brain without Contrast 8/8/2020  No acute intracranial MR abnormalities.  Images chronic microvascular ischemic changes.    Cardiac Imaging     TTE 8/9/2020  · Technically challenging portable study.  · Low normal left ventricular systolic function. The estimated ejection fraction is 50-55%.  · Grade III (severe) left ventricular diastolic dysfunction consistent with restrictive physiology.  · Mild mitral regurgitation.  · Normal right ventricular systolic function.  · Severe left atrial enlargement.  · Small pericardial effusion.

## 2020-08-09 NOTE — PT/OT/SLP EVAL
Speech Language Pathology Evaluation  Bedside Swallow    Patient Name:  Amadeo Stevenson   MRN:  [id number]  Admitting Diagnosis: Community acquired bacterial pneumonia    Recommendations:                 General Recommendations:  Speech language evaluation and Cognitive-linguistic evaluation  Diet recommendations:  Regular, Thin   Aspiration Precautions: Standard aspiration precautions   General Precautions: Standard, fall  Communication strategies:  none    History:     Past Medical History:   Diagnosis Date    CHF (congestive heart failure), NYHA class II, acute, combined     Chronic a-fib     CKD (chronic kidney disease)     Diabetes mellitus type 2, uncontrolled, with complications     Gout     HTN (hypertension), benign        History reviewed. No pertinent surgical history.    Prior Intubation HX:  None this admission    Modified Barium Swallow: None on file    Chest X-Rays 8/7/20: Findings indicating a pneumonia involving the superior aspect of the right lung.    Prior diet: reg/thin    Subjective     Patient awake and alert during session  Communicated with nurse prior to session     Pain/Comfort:  · Pain Rating 1: 0/10  · Pain Rating Post-Intervention 1: 0/10    Objective:     Oral Musculature Evaluation  · Oral Musculature: WFL  · Dentition: present and adequate  · Secretion Management: adequate  · Mucosal Quality: good  · Mandibular Strength and Mobility: WFL  · Oral Labial Strength and Mobility: WFL  · Lingual Strength and Mobility: WFL  · Buccal Strength and Mobility: WFL  · Volitional Cough: adequate  · Volitional Swallow: timely; good laryngeal elevation  · Voice Prior to PO Intake: WFL    Bedside Swallow Eval:   Consistencies Assessed:  · Thin liquids x6 (via straw)  · Solids x5 (sausage and eggs)     Oral Phase:   · WFL    Pharyngeal Phase:   · no overt clinical signs/symptoms of aspiration  · no overt clinical signs/symptoms of pharyngeal dysphagia    Compensatory Strategies  · None    Treatment:  Patient seen for a bedside swallow eval. He was sitting up in chair eating breakfast during session. Patient reported that he was at baseline from a ST perspective. SLP educated patient regarding role of ST in his POC. Whiteboard updated.     Assessment:     Amadeo Stevenson is a 69 y.o. male who presents with a safe oropharyngeal swallow. ST will f/u for a formal speech/language/cognitive eval.    Goals:   Multidisciplinary Problems     SLP Goals        Problem: SLP Goal    Goal Priority Disciplines Outcome   SLP Goal     SLP    Description: Speech-Language Pathology Goals  Goals to be met by 8/16/20  1. Patient will tolerate a regular diet/thin liquids with no s/s of aspiration.   2. Patient will participate in a speech/language/cognitive eval.                   Plan:     · Patient to be seen:  4 x/week   · Plan of Care expires:  09/08/20  · Plan of Care reviewed with:  patient   · SLP Follow-Up:  Yes       Discharge recommendations:  (pending)   Barriers to Discharge:  None    Time Tracking:     SLP Treatment Date:   08/09/20  Speech Start Time:  1009  Speech Stop Time:  1018     Speech Total Time (min):  9 min    Billable Minutes: Eval Swallow and Oral Function 9    Juan F Willingham CCC-SLP  Speech-Language Pathology  Pager: [phone]   08/09/2020

## 2020-08-09 NOTE — SUBJECTIVE & OBJECTIVE
Interval History:  NAEON. MRI and EEG negative for any acute intracranial process. Pt with leukocytosis, no fever. Ordered amylase, lipase, and d-dimer to look for non-infectious reason for elevated wbc    Review of Systems   Constitutional: Negative for unexpected weight change.   HENT: Negative for voice change.    Eyes: Negative for visual disturbance.   Respiratory: Negative for wheezing.    Cardiovascular: Negative for chest pain, palpitations and leg swelling.   Gastrointestinal: Negative for abdominal distention.   Genitourinary: Negative for difficulty urinating.   Musculoskeletal: Negative for neck pain.   Neurological: Positive for weakness.   Hematological: Does not bruise/bleed easily.   Psychiatric/Behavioral: Negative for agitation.       Objective:     Vitals:  Temp: 98.2 °F (36.8 °C)  Pulse: 90  Rhythm: sinus bradycardia  BP: (!) 192/88  MAP (mmHg): 119  Resp: 17  SpO2: 98 %  O2 Device (Oxygen Therapy): nasal cannula    Temp  Min: 97.8 °F (36.6 °C)  Max: 98.4 °F (36.9 °C)  Pulse  Min: 53  Max: 104  BP  Min: 136/104  Max: 192/88  MAP (mmHg)  Min: 95  Max: 122  Resp  Min: 15  Max: 29  SpO2  Min: 95 %  Max: 99 %  Oxygen Concentration (%)  Min: 3  Max: 3    08/08 0701 - 08/09 0700  In: 1020.1 [P.O.:200; I.V.:270.1]  Out: 2025 [Urine:2025]   Unmeasured Output  Urine Occurrence: 1  Stool Occurrence: 0       Physical Exam  Constitutional:       General: He is not in acute distress.     Appearance: Normal appearance. He is obese.   HENT:      Head: Normocephalic and atraumatic.      Right Ear: External ear normal.      Left Ear: External ear normal.      Nose: Nose normal. No congestion.      Mouth/Throat:      Mouth: Mucous membranes are moist.      Pharynx: Oropharynx is clear.   Eyes:      General: No scleral icterus.     Extraocular Movements: Extraocular movements intact.      Conjunctiva/sclera: Conjunctivae normal.      Pupils: Pupils are equal, round, and reactive to light.   Neck:       Musculoskeletal: Normal range of motion and neck supple.   Cardiovascular:      Rate and Rhythm: Rhythm irregular.   Pulmonary:      Effort: Pulmonary effort is normal.      Breath sounds: Normal breath sounds.   Abdominal:      Palpations: Abdomen is soft.      Comments: Obese    Musculoskeletal: Normal range of motion.   Skin:     General: Skin is warm and dry.      Capillary Refill: Capillary refill takes less than 2 seconds.   Neurological:      General: No focal deficit present.      Mental Status: He is alert.      GCS: GCS eye subscore is 4. GCS verbal subscore is 5. GCS motor subscore is 6.      Cranial Nerves: Cranial nerves are intact.      Sensory: Sensation is intact.      Motor: Motor function is intact.      Coordination: Coordination is intact. Finger-Nose-Finger Test normal.      Comments: Pt is awake, alert, and oriented to person, place, time, and situation   Recent and remote memory slightly impaired, unable to remember all words given within 3 mins   Cranial nerves examined and are grossly intact  MAXWELL spontaneously   Sensation to light touch intact throughout    Psychiatric:         Mood and Affect: Mood normal.           Medications:  Continuous Scheduledalbuterol-ipratropium, 3 mL, Q4H  amiodarone, 200 mg, Daily  apixaban, 5 mg, BID  aspirin, 81 mg, Daily  atorvastatin, 80 mg, Daily  carvediloL, 25 mg, BID  famotidine, 20 mg, BID  hydrALAZINE, 100 mg, Q8H  hydrALAZINE, 75 mg, Once  insulin aspart U-100, 10 Units, TIDWM  insulin detemir U-100, 50 Units, Daily  piperacillin-tazobactam (ZOSYN) IVPB, 4.5 g, Q8H  polyethylene glycol, 17 g, Daily  senna-docusate 8.6-50 mg, 1 tablet, BID  silodosin, 8 mg, Daily  vancomycin (VANCOCIN) IVPB, 1,500 mg, Q24H    PRNacetaminophen, 650 mg, Q4H PRN  dextrose 50%, 12.5 g, PRN  dextrose 50%, 25 g, PRN  glucagon (human recombinant), 1 mg, PRN  glucose, 16 g, PRN  glucose, 24 g, PRN  insulin aspart U-100, 1-10 Units, QID (AC + HS) PRN  magnesium oxide, 800 mg,  PRN  magnesium oxide, 800 mg, PRN  ondansetron, 4 mg, Q6H PRN  potassium chloride, 40 mEq, PRN  potassium chloride, 40 mEq, PRN  potassium chloride, 60 mEq, PRN  potassium, sodium phosphates, 2 packet, PRN  potassium, sodium phosphates, 2 packet, PRN  potassium, sodium phosphates, 2 packet, PRN  senna-docusate 8.6-50 mg, 1 tablet, Daily PRN  sodium chloride 0.9%, 10 mL, PRN  vancomycin - pharmacy to dose, , pharmacy to manage frequency      Today I personally reviewed pertinent laboratory results, notably:leukocystosis     Diet  Diet diabetic [facility]; 2000 Calorie; Cardiac (Low Na/Chol)  Diet diabetic [facility]; 2000 Calorie; Cardiac (Low Na/Chol)

## 2020-08-09 NOTE — PROGRESS NOTES
Nursing Transfer Note       Transfer 9086 to 950    Transfer via bed    Transfer with: O2 tank, tele    Transported by RNx2    Medicines sent: yes    Chart sent with patient: yes    Belongings sent with patient: (specify belongings) yes; wedding ring, iphone, clothes; medications in pt belonging bag (creams); 2 insulin pens    Notified: DYAN Bonner    Bedside Neuro assessment performed: yes    Bedside Handoff given to: DYAN Bonner    Upon arrival to floor: bed locked, pt oriented to room

## 2020-08-09 NOTE — ASSESSMENT & PLAN NOTE
68 y/o male being treated for PNA and had epsidoe that lasted a few hours where he was totally unresponsive. He was started on heparin drip and transfer to [facility]. Supposedly TIA but the symptomology does not match up with this. MRI reviewed- no evidence of intracranial pathology.     Antithrombotics: Eliquis 5 mg BID for Atrial fibrillation      Statins: Lipitor 40 mg    Aggressive risk factor modification: HTN, DM, HLD, Diet, Exercise, Obesity, A-Fib, CHF, CKD     Rehab efforts: The patient has been evaluated by a stroke team provider and the therapy needs have been fully considered based off the presenting complaints and exam findings. The following therapy evaluations are needed: None    Diagnostics ordered/pending: None     VTE prophylaxis: Mechanical prophylaxis: Place SCDs  None: Reason for No Pharmacological VTE Prophylaxis: Currently on anticoagulation    BP parameters: unsure if ischemic event normotensive    Thank you for involving us in the care of this patient. Vascular Neurology will sign off at this time.

## 2020-08-10 PROBLEM — N17.9 AKI (ACUTE KIDNEY INJURY): Status: ACTIVE | Noted: 2020-01-01

## 2020-08-10 PROBLEM — J96.01 ACUTE HYPOXEMIC RESPIRATORY FAILURE: Status: ACTIVE | Noted: 2020-01-01

## 2020-08-10 PROBLEM — I50.32 CHRONIC DIASTOLIC CONGESTIVE HEART FAILURE: Status: ACTIVE | Noted: 2020-01-01

## 2020-08-10 PROBLEM — D72.829 LEUKOCYTOSIS: Status: ACTIVE | Noted: 2020-01-01

## 2020-08-10 PROBLEM — R41.89 UNRESPONSIVE: Status: RESOLVED | Noted: 2020-01-01 | Resolved: 2020-01-01

## 2020-08-10 PROBLEM — K59.00 CONSTIPATION: Status: ACTIVE | Noted: 2020-01-01

## 2020-08-10 NOTE — ASSESSMENT & PLAN NOTE
- Stroke risk factor  - Currently on Coreg 25 BID and Hydralazine 100 TID  - Given he is an AA male, may be reasonable to trial HCTZ (takes Lasix at home though) vs CCB like amlodipine if uncontrolled

## 2020-08-10 NOTE — ASSESSMENT & PLAN NOTE
Home Regimen: 30 Units every am, SSI with meals (typically will use 4-5 units)   - Weight based: Should be on 30 units long acting and 10 Units TID   - Stroke risk factor  - HgB A1C 7.9 on this admission   - Detemir 10 U's BID per NCC- Increased to 15 BID by myself  - Scheduled Aspart 5 Units TIDM  - POCT glucose q ac&hs

## 2020-08-10 NOTE — CONSULTS
[facility]  Physical Medicine & Rehab  Consult Note    Patient Name: Amadeo Stevenson  MRN: [id number]  Admission Date: 8/7/2020  Hospital Length of Stay: 3 days  Attending Physician: Finesse Webster MD     Inpatient consult to Physical Medicine & Rehabilitation  Consult performed by: Eun Charlton NP  Consult requested by:  Finesse Webster MD    Reason for Consult:  assess rehabilitation needs    Consults  Subjective:     Principal Problem: Community acquired bacterial pneumonia    HPI: Amadeo Stevenson is a 69-year-old male with PMHx of HTN, Atrial Fibrillation, HLD, DM2, CKD, Gout, & HFpEF.  Patient presented to OSH with AMS and unrepsonsiveness. CT Head and CTA of Head were negative at OSH. After several hours, symptoms and deficits resolved spontaneously. He was treated for TIA with Heparin gtt. OSH hospital course complicated by RUL PNA.  Transferred to [facility] on 8/7 for further evaluation and management.  Upon admission,  MRI brain revealed no acute path. EEG with no seizures. Hospital course complicated by leukocytosis, RUL PNA (on zosyn/vanc IV), & HTN.    Functional History: Patient lives with wife in a single story home with no steps to enter.  Prior to admission, (I) with ADLs and Mod(I) for mobility w/ SPC.  DME: SPC.    Hospital Course: 08/8/2020: Bed mobility Min-SBA.  Sit to stand CGA-SBA.  Ambulated 12 ft CGA.  UBD Kal and LBD Moda.  8/9: Passed bedside swallow evaluation.  SLP recommending regular diet and thin liquids. Cog-ling eval pending.     Past Medical History:   Diagnosis Date    CHF (congestive heart failure), NYHA class II, acute, combined     Chronic a-fib     CKD (chronic kidney disease)     Diabetes mellitus type 2, uncontrolled, with complications     Gout     HTN (hypertension), benign      History reviewed. No pertinent surgical history.  Review of patient's allergies indicates:  No Known Allergies    Scheduled Medications:    albuterol-ipratropium  3 mL  Nebulization Q4H    amiodarone  200 mg Oral Daily    apixaban  5 mg Oral BID    aspirin  81 mg Oral Daily    atorvastatin  80 mg Oral Daily    carvediloL  25 mg Oral BID    famotidine  20 mg Oral BID    hydrALAZINE  100 mg Oral Q8H    insulin aspart U-100  5 Units Subcutaneous TIDWM    insulin detemir U-100  15 Units Subcutaneous BID    piperacillin-tazobactam (ZOSYN) IVPB  4.5 g Intravenous Q8H    polyethylene glycol  17 g Oral Daily    senna-docusate 8.6-50 mg  1 tablet Oral BID    silodosin  8 mg Oral Daily    vancomycin (VANCOCIN) IVPB  1,500 mg Intravenous Q24H       PRN Medications: acetaminophen, dextrose 50%, dextrose 50%, glucagon (human recombinant), glucose, glucose, insulin aspart U-100, ondansetron, Pharmacy to dose Vancomycin consult **AND** vancomycin - pharmacy to dose    Family History     Unknown per patient        Tobacco Use    Smoking status: Never Smoker    Smokeless tobacco: Never Used   Substance and Sexual Activity    Alcohol use: Not on file    Drug use: Not on file    Sexual activity: Not on file     Review of Systems   Constitutional: Positive for activity change. Negative for fatigue and fever.   HENT: Negative for trouble swallowing and voice change.    Eyes: Negative for photophobia and visual disturbance.   Respiratory: Positive for shortness of breath. Negative for cough.    Cardiovascular: Negative for chest pain and palpitations.   Gastrointestinal: Negative for nausea and vomiting.   Genitourinary: Negative for difficulty urinating and flank pain.   Musculoskeletal: Positive for gait problem. Negative for arthralgias.   Skin: Negative for color change and rash.   Neurological: Positive for weakness. Negative for facial asymmetry, speech difficulty and numbness.   Psychiatric/Behavioral: Negative for agitation and confusion.     Objective:     Vital Signs (Most Recent):  Temp: 97.7 °F (36.5 °C) (08/10/20 0832)  Pulse: 77 (08/10/20 0856)  Resp: 17 (08/10/20  0856)  BP: (!) 143/77 (08/10/20 0832)  SpO2: (!) 93 % (08/10/20 0856)    Vital Signs (24h Range):  Temp:  [97.7 °F (36.5 °C)-98.7 °F (37.1 °C)] 97.7 °F (36.5 °C)  Pulse:  [] 77  Resp:  [16-41] 17  SpO2:  [90 %-100 %] 93 %  BP: (134-174)/(69-83) 143/77     Body mass index is 41.57 kg/m².    Physical Exam  Constitutional:       General: He is not in acute distress.     Appearance: He is well-developed.   HENT:      Head: Normocephalic and atraumatic.   Eyes:      General:         Right eye: No discharge.         Left eye: No discharge.   Neck:      Musculoskeletal: Neck supple.   Cardiovascular:      Pulses: Normal pulses.   Chest:      Comments: Mild increased effort   On NC 2L  Abdominal:      General: There is no distension.      Palpations: Abdomen is soft.   Musculoskeletal:         General: No deformity.   Skin:     General: Skin is warm and dry.   Neurological:      Mental Status: He is alert and oriented to person, place, and time.      Cranial Nerves: No dysarthria.      Motor: No weakness.      Comments: Follows commands    Psychiatric:         Behavior: Behavior normal.         Cognition and Memory: Cognition is not impaired.     Diagnostic Results:  Labs: Reviewed  X-Ray: Reviewed  US: Reviewed  MRI: Reviewed    Assessment/Plan:     * Community acquired bacterial pneumonia  -on zosyn/vanc IV    Chronic atrial fibrillation  -on eliquis     TIA (transient ischemic attack)  -MRI reviewed- no evidence of intracranial pathology    See hospital course for functional, cognitive/speech/language, and nutrition/swallow status.    Recommendations  -  Encourage mobility, OOB in chair, and early ambulation as appropriate   -  PT, OT, SLP evaluate and treat  -  Monitor mood and sleep disturbances and establish consistent sleep-wake cycle  -  Monitor for bowel and bladder dysfunction  -  Monitor for shoulder pain/subluxation and spasticity  -  DVT prophylaxis if appropriate  -  Monitor for and prevent skin  breakdown and pressure ulcers  · Early mobility, repositioning/weight shifting every 20-30 minutes when sitting, turn patient every 2 hours, proper mattress/overlay and chair cushioning, pressure relief/heel protector boots    PAC recommendation: Inpatient Rehab. Patient requesting to return home to [place] with HH therapy.      Thank you for your consult.     Eun Charlton NP  Department of Physical Medicine & Rehab  [facility]

## 2020-08-10 NOTE — ASSESSMENT & PLAN NOTE
70 y/o male being treated for PNA and had epsidoe that lasted a few hours where he was totally unresponsive. He was started on heparin drip and transfer to [facility]. Supposedly TIA but the symptomology does not match up with this. MRI reviewed- no evidence of intracranial pathology.     Antithrombotics: Eliquis 5 mg BID for Atrial fibrillation      Statins: Lipitor 40 mg    Aggressive risk factor modification: HTN, DM, HLD, Diet, Exercise, Obesity, A-Fib, CHF, CKD     Rehab efforts: The patient has been evaluated by a stroke team provider and the therapy needs have been fully considered based off the presenting complaints and exam findings. The following therapy evaluations are needed: None    Diagnostics ordered/pending: None     VTE prophylaxis: Mechanical prophylaxis: Place SCDs  None: Reason for No Pharmacological VTE Prophylaxis: Currently on anticoagulation    BP parameters: unsure if ischemic event normotensive

## 2020-08-10 NOTE — PT/OT/SLP EVAL
"Speech Language Pathology Evaluation  Cognitive Communication    Patient Name:  Amadeo Stevenson   MRN:  [id number]   950/950 A    Admitting Diagnosis: Community acquired bacterial pneumonia    Recommendations:     Recommendations:                General Recommendations:  diet follow up, ongoing slc eval, cog/ling tx  Diet recommendations:  Regular, Thin   Aspiration Precautions:   · Feed only when awake/alert,  · Frequent oral care,  · HOB to 90 degrees and remain upright 30 minutes after meals,  · Monitor with meals at this time- patient lethargic (8/10) and not safe to feed self alone   General Precautions: Standard, fall, aspiration  Communication strategies:  none    History:     Past Medical History:   Diagnosis Date    CHF (congestive heart failure), NYHA class II, acute, combined     Chronic a-fib     CKD (chronic kidney disease)     Diabetes mellitus type 2, uncontrolled, with complications     Gout     HTN (hypertension), benign        History reviewed. No pertinent surgical history.    Social History: Patient lives with wife. Patient reports he lives in [place]. He reports staying in his vacation home in [place]. He states, "We were actually supposed to go home today."    Chest X-Rays 8/10: FINDINGS: Study was obtained at the bedside with the patient in steep left posterior oblique rotation.  X-ray beam attenuation and scatter occur in generous overlying soft tissues.  Allowing for the patient's rotation, I doubt mediastinal shift.  Cardiac silhouette is mildly enlarged similar to earlier studies.  Calcific plaque noted at the level of the aortic arch in this 69-year-old man.  Recent chest radiograph performed 08/08/2020 revealed dense opacification of the periphery of the anterior and/or posterior segment of the right upper lobe with air bronchograms apparent more centrally.  This abnormality persists.  Since that time the patient has developed widespread patchy heterogeneous subsegmental opacities " concerning for pulmonary edema, aspiration or multifocal pneumonia.  I detect no convincing evidence of pleural fluid noting that the extensive lung disease could obscure radiographic evidence of pleural fluid.  I detect no pneumothorax, pneumomediastinum, pneumoperitoneum or significant osseous abnormality.  Impression:  Markedly abnormal chest radiograph.    Prior diet: reg/thin    Subjective     Patient lethargic requiring max stimulation to remain awake throughout session. RN aware.     Objective:     Assessment negatively impacted by lethargy.     COGNITIVE STATUS:  Behavioral Observations: lethargic  Memory:  · Immediate: wfl  · Short Term: tba  · Long Term: tba  Orientation: oriented to name, , place, situation; patient not oriented to time  Attention: impaired sustained and divided attn  Problem Solving: safety awareness wfl  Insight Awareness: impaired  Sequencing:   · Functional Events: impaired  · Mental Manipulation: tba  Simple Money/Time Management: tba    LANGUAGE:   Receptive Language:  Simple y/n Questions: wfl  Complex y/n Questions: wfl  Identification: wfl  1 Step Directions: wfl  2 Step Directions: tba  Complex Directions: tba    Expressive Language:   Naming:   · Divergent: impaired; patient required max cues to name 5 items in a concrete category  · Convergent: impaired  · Confrontational: wfl  Automatic Speech: wfl  Sentence Completion: wfl  Responsive Speech: wfl  Repetition: wfl    Motor Speech: tba    Voice: hoarse qand breathy vocal quality    Reading: tba    Writing: tba    Visual-Spatial: tba    Treatment: Patient seen for an ongoing swallowing assessment. He was found with lunch tray in front of him while sleeping. Patient accepted min trials 2/2 lethargy. He was assessed with sips of Sprite x2. Coughing/choking noted following initial sip of Sprite suspected to be 2/2 lethargy. Patient denies difficulties with meals. RN reports no noted coughing with breakfast or whole medications  this AM.     Assessment:   Amadeo Stevenson is a 69 y.o. male with an SLP diagnosis of Dysphagia and Cognitive-Linguistic Impairment. ST will continue to follow.     Goals:   Multidisciplinary Problems     SLP Goals        Problem: SLP Goal    Goal Priority Disciplines Outcome   SLP Goal     SLP Ongoing, Progressing   Description: Speech-Language Pathology Goals  Goals to be met by 8/16/20  1. Patient will tolerate a regular diet/thin liquids with no s/s of aspiration.   2. Patient will participate in a speech/language/cognitive eval. -ongoing  3. Pt will answer orientation questions x4 with 90%$ acc.   4. Pt will recall functional information after a 2+ minute delay given min cues.   5. Pt will name 5-8 items in a concrete category in 60 seconds given mod cues.                    Plan:   · Patient to be seen:  4 x/week   · Plan of Care expires:  09/08/20  · Plan of Care reviewed with:  patient   · SLP Follow-Up:  Yes       Discharge recommendations:  Discharge Facility/Level of Care Needs: (pending)   Barriers to Discharge:  Level of Skilled Assistance Needed      Time Tracking:   SLP Treatment Date:   08/10/20  Speech Start Time:  1322  Speech Stop Time:  1343     Speech Total Time (min):  21 min    Billable Minutes: Eval 11 and Treatment Swallowing Dysfunction 10    RAINE Herndon, CCC-SLP   Pager: [phone]  08/10/2020

## 2020-08-10 NOTE — PROGRESS NOTES
Pharmacist Renal Dose Adjustment Note    Amadeo Stevenson is a 69 y.o. male being treated with the medication famotidine.    Patient Data:    Vital Signs (Most Recent):  Temp: 97.7 °F (36.5 °C) (08/10/20 0832)  Pulse: 76 (08/10/20 1212)  Resp: (!) 21 (08/10/20 1212)  BP: (!) 143/77 (08/10/20 0832)  SpO2: (!) 93 % (08/10/20 1212)   Vital Signs (72h Range):  Temp:  [97.7 °F (36.5 °C)-98.7 °F (37.1 °C)]   Pulse:  []   Resp:  [12-41]   BP: (134-195)/()   SpO2:  [90 %-100 %]      Recent Labs   Lab 08/07/20  2345 08/09/20  0409 08/10/20  0605   CREATININE 2.1* 2.1* 2.4*     Serum creatinine: 2.4 mg/dL (H) 08/10/20 0605  Estimated creatinine clearance: 38.4 mL/min (A)    Medication: Famotidine 20 mg PO BID will be changed to famotidine 20 mg PO daily     Pharmacist's Name: Davey Diggs  Pharmacist's Extension: [id number]

## 2020-08-10 NOTE — ASSESSMENT & PLAN NOTE
-MRI reviewed- no evidence of intracranial pathology    See hospital course for functional, cognitive/speech/language, and nutrition/swallow status.    Recommendations  -  Encourage mobility, OOB in chair, and early ambulation as appropriate   -  PT, OT, SLP evaluate and treat  -  Monitor mood and sleep disturbances and establish consistent sleep-wake cycle  -  Monitor for bowel and bladder dysfunction  -  Monitor for shoulder pain/subluxation and spasticity  -  DVT prophylaxis if appropriate  -  Monitor for and prevent skin breakdown and pressure ulcers  · Early mobility, repositioning/weight shifting every 20-30 minutes when sitting, turn patient every 2 hours, proper mattress/overlay and chair cushioning, pressure relief/heel protector boots

## 2020-08-10 NOTE — SUBJECTIVE & OBJECTIVE
Neurologic Chief Complaint: Unresponsive- describes episode of being awake and aware of those around him but unable to move arms or legs, speak    Subjective:     Interval History: Patient is seen for follow-up neurological assessment and treatment recommendations:     JAKE. NIHSS remains 0. Continuing antibiotics for CAP, but with persistent increasing leukocytosis. Blood cultures remain NGTD. Respiratory culture unremarkable. Repeat respiratory culture ordered. UA and reflux pending as well. No diarrhea. Mild abdominal pain to right side- passing gas though last BM 2 days ago- not uncommon for him. Day 5 of Vanc/Zosyn (including OSH doses- per patient, unclear as no records).     HPI, Past Medical, Family, and Social History remains the same as documented in the initial encounter.     Review of Systems   Gastrointestinal: Positive for abdominal pain (right side, mild 3/10) and constipation (perhaps). Negative for diarrhea, nausea and vomiting.   Musculoskeletal: Negative for arthralgias, back pain and myalgias.   Skin: Negative for rash and wound.   Neurological: Negative for dizziness, weakness and headaches.   Psychiatric/Behavioral: Positive for decreased concentration. Negative for agitation and confusion. Hallucinations:    The patient is not nervous/anxious.      Scheduled Meds:   albuterol-ipratropium  3 mL Nebulization Q4H    amiodarone  200 mg Oral Daily    apixaban  5 mg Oral BID    aspirin  81 mg Oral Daily    atorvastatin  80 mg Oral Daily    carvediloL  25 mg Oral BID    famotidine  20 mg Oral BID    hydrALAZINE  100 mg Oral Q8H    insulin aspart U-100  5 Units Subcutaneous TIDWM    insulin detemir U-100  15 Units Subcutaneous BID    piperacillin-tazobactam (ZOSYN) IVPB  4.5 g Intravenous Q8H    polyethylene glycol  17 g Oral Daily    senna-docusate 8.6-50 mg  1 tablet Oral BID    silodosin  8 mg Oral Daily    vancomycin (VANCOCIN) IVPB  1,500 mg Intravenous Q24H     Continuous  Infusions:  PRN Meds:acetaminophen, albuterol-ipratropium, dextrose 50%, dextrose 50%, glucagon (human recombinant), glucose, glucose, insulin aspart U-100, ondansetron, Pharmacy to dose Vancomycin consult **AND** vancomycin - pharmacy to dose    Objective:     Vital Signs (Most Recent):  Temp: 97.7 °F (36.5 °C) (08/10/20 0832)  Pulse: 76 (08/10/20 1212)  Resp: (!) 21 (08/10/20 1212)  BP: (!) 143/77 (08/10/20 0832)  SpO2: (!) 93 % (08/10/20 1212)  BP Location: Left arm    Vital Signs Range (Last 24H):  Temp:  [97.7 °F (36.5 °C)-98.7 °F (37.1 °C)]   Pulse:  []   Resp:  [16-27]   BP: (141-163)/(76-83)   SpO2:  [90 %-100 %]   BP Location: Left arm    Physical Exam  Vitals signs and nursing note reviewed.   Constitutional:       General: He is not in acute distress.     Appearance: Normal appearance. He is obese. He is not ill-appearing.   HENT:      Head: Normocephalic and atraumatic.   Eyes:      General: No scleral icterus.     Pupils: Pupils are equal, round, and reactive to light.   Cardiovascular:      Rate and Rhythm: Normal rate. Rhythm irregular.      Pulses: Normal pulses.           Radial pulses are 2+ on the right side and 2+ on the left side.   Pulmonary:      Effort: No respiratory distress.      Breath sounds: Wheezing and rales (bibasilar) present.      Comments: Using some accessory muscles  O2 to 5 L's NC  Abdominal:      General: Bowel sounds are normal. There is no distension.      Palpations: Abdomen is soft.      Tenderness: There is abdominal tenderness (right side). There is no guarding or rebound.      Comments: Negative Brito's sign  Negative McBurney's point    Musculoskeletal:         General: No tenderness or deformity.      Right lower leg: Edema present.      Left lower leg: Edema present.   Skin:     General: Skin is warm and dry.      Coloration: Skin is not jaundiced.      Comments: No evidence of foot ulcers or infection in intertriginous areas of bilateral feet   Neurological:       General: No focal deficit present.      Mental Status: He is alert and oriented to person, place, and time. Mental status is at baseline.      Cranial Nerves: No cranial nerve deficit.      Sensory: No sensory deficit.      Motor: No weakness.      Coordination: Coordination normal.   Psychiatric:         Mood and Affect: Mood normal.         Behavior: Behavior normal.         Neurological Exam:   LOC: alert  Attention Span: Good   Language: No aphasia  Articulation: No dysarthria  Orientation: Person, Place, Time   Visual Fields: Full  EOM (CN III, IV, VI): Full/intact  Pupils (CN II, III): PERRL  Facial Sensation (CN V): Normal  Facial Movement (CN VII): Symmetric facial expression    Motor: Arm left  Normal 5/5  Leg left  Normal 5/5  Arm right  Normal 5/5  Leg right Normal 5/5  Sensation: Intact to light touch, temperature and vibration  Tone: Normal tone throughout    Laboratory:  CBC:   Recent Labs   Lab 08/10/20  0604   WBC 22.71*   RBC 3.64*   HGB 9.8*   HCT 30.4*      MCV 84   MCH 26.9*   MCHC 32.2     Lipid Panel:   Recent Labs   Lab 08/08/20  0115   CHOL 89*   LDLCALC 50.6*   HDL 25*   TRIG 67     Hgb A1C:   Recent Labs   Lab 08/08/20  0115   HGBA1C 7.9*     TSH:   Recent Labs   Lab 08/08/20  0115   TSH 0.370*       Diagnostic Results     Brain Imaging/Vessel Imaging     MRI Brain without Contrast 8/8/2020  No acute intracranial MR abnormalities.  Images chronic microvascular ischemic changes.    Cardiac Imaging     TTE 8/9/2020  · Technically challenging portable study.  · Low normal left ventricular systolic function. The estimated ejection fraction is 50-55%.  · Grade III (severe) left ventricular diastolic dysfunction consistent with restrictive physiology.  · Mild mitral regurgitation.  · Normal right ventricular systolic function.  · Severe left atrial enlargement.  · Small pericardial effusion.

## 2020-08-10 NOTE — ASSESSMENT & PLAN NOTE
- BL Cr 2.1  - Cr increased to 2.4 on 8/10   - Will trial Lasix 20 IV given hannah webb, will recheck BMP this afternoon to assess

## 2020-08-10 NOTE — ASSESSMENT & PLAN NOTE
- BL Cr 2.1  - Cr increased to 2.4 on 8/10   - Will trial Lasix 20 IV given hannah webb, will recheck BMP this afternoon to assess   - If trend increases, reasonable to get urine lytes for further evaluation  - Must consider antibiotics as possible culprit as he has been on vanc/zosyn  - Urinating well, but reasonable to assess with renal US in workup if TAO continues

## 2020-08-10 NOTE — ASSESSMENT & PLAN NOTE
- CHADS-Vasc score 5 ; HAS-BLED 2  - Stroke risk factor  - Continue Amiodoraone 200 QD  - Begin Eliquis 5 mg BID

## 2020-08-10 NOTE — PLAN OF CARE
Cm met with patient to obtain discharge planning assessment.  Patient transferred from [facility].  Planned discharge is to return to [facility] - Plan (A) or home with family with home health - Plan (B).    Patient is from [place] and was in [place] visiting family.    PCP:  To Obtain Unable     Payor:  Payor: MEDICARE / Plan: MEDICARE PART A & B / Product Type: Government /      Pharmacy:  No Pharmacies Listed    08/10/20 1526   Discharge Assessment   Assessment Type Discharge Planning Assessment   Confirmed/corrected address and phone number on facesheet? Yes   Assessment information obtained from? Patient   Communicated expected length of stay with patient/caregiver no   Prior to hospitilization cognitive status: Alert/Oriented   Prior to hospitalization functional status: Assistive Equipment   Current cognitive status: Alert/Oriented   Current Functional Status: Needs Assistance   Facility Arrived From: [facility]   Lives With spouse   Able to Return to Prior Arrangements   (tbd)   Is patient able to care for self after discharge? Unable to determine at this time (comments)   Who are your caregiver(s) and their phone number(s)? Emerita Stevenson - spouse [phone]   [facility]   Readmission Within the Last 30 Days no previous admission in last 30 days   Patient currently being followed by outpatient case management? No   Patient currently receives any other outside agency services? No   Equipment Currently Used at Home cane, straight   Do you have any problems affording any of your prescribed medications? No   Is the patient taking medications as prescribed? yes   Does the patient have transportation home? No   Does the patient receive services at the [facility]? No   Discharge Plan A Other  (Return to [facility])   Discharge Plan B Home with family;Home Health   DME Needed Upon Discharge  none   Patient/Family in Agreement with Plan unable to  assess

## 2020-08-10 NOTE — HPI
Amadeo Stevenson is a 69-year-old male with PMHx of HTN, Atrial Fibrillation, HLD, DM2, CKD, Gout, & HFpEF.  Patient presented to OSH with AMS and unrepsonsiveness. CT Head and CTA of Head were negative at OSH. After several hours, symptoms and deficits resolved spontaneously. He was treated for TIA with Heparin gtt. OSH hospital course complicated by RUL PNA.  [place] [facility] [place] evaluation and management.  Upon admission,  MRI brain revealed no acute path. EEG with no seizures. Hospital course complicated by leukocytosis, RUL PNA (on zosyn/vanc IV), & HTN.    Functional History: Patient lives with wife in a single story home with no steps to enter.  Prior to admission, (I) with ADLs and Mod(I) for mobility w/ SPC.  DME: SPC.

## 2020-08-10 NOTE — PHYSICIAN QUERY
"PT Name: Amadeo Stevenson  MR #: [id number]     Diagnosis Clarification      CDS/: Irma Aparicio RN, CDS               Contact information: [email or web address]    This form is a permanent document in the medical record.     Query Date: August 10, 2020    Dear Provider,  By submitting this query, we are merely seeking further clarification of documentation.  Please utilize your independent clinical judgment when addressing the question(s) below.     The medical record contains the following:    Supporting Clinical Information Location in Medical Record     --Today patient had episode of AMS, unresponsiveness. He states, "I could listen to everyone but I couldn't move".  --CT Head and CTA of Head were negative at OSH (CDs and Reports in Chart). After several hours, symptoms and deficits resolved spontaneously.   He was treated for TIA with Heparin gtt and transferred to [facility] for higher level of care.  --TIA (transient ischemic attack)  70 y/o M with extensive medical history who presents from OSH for TIA while being treated for PNA  CTH and CTA negative at OSH. Neurologically intact on transfer and arrival to [facility]    --Altered mental state      - See TIA      - Unclear if he received any new medications at OSH prior to this episode; he cannot recall anything in particular           - No recurrent episodes   --TIA (transient ischemic attack)         -epsidoe that lasted a few hours where he was totally unresponsive. Supposedly TIA but the symptomology does not match up with this. MRI reviewed- no evidence of intracranial pathology    --Patient without evidence of TIA or stroke on exam- no focal deficits- NIHSS of 0. As such, vascular neurology has no recommendations for this patient from a stroke standpoint as this was effectively ruled out via imaging  --Stroke Etiology: Not Applicable/Not Ischemic Stroke/Not TIA  --Final Active Diagnosis:          -TIA (transient ischemic attack)      NCC H&P " 8/8                      Vas Neuro Note 8/10                    Discharge Summary     Please clarify if the _TIA (transient ischemic attack)__ diagnosis has been:    [  ] Ruled In   [  ] Ruled In, Now Resolved   [x  ] Ruled Out   [  ] Other/Clarification of findings (please specify)_______________    [   ] Clinically undetermined           Please document in your progress notes daily for the duration of treatment, until resolved, and include in your discharge summary.

## 2020-08-10 NOTE — ASSESSMENT & PLAN NOTE
- See TIA  - Unclear if he received any new medications at OSH prior to this episode; he cannot recall anything in particular   - No recurrent episodes

## 2020-08-10 NOTE — PROGRESS NOTES
"[facility]  Vascular Neurology  Comprehensive Stroke Center  Progress Note    Assessment/Plan:     * Community acquired bacterial pneumonia  - Vanc/Zosyn Day 5 (Day 3 at [facility]- unclear if he received antibiotics at OSH- states he did for 2 days before transfer for higher level of care)  - CXR repeated today- "dense opacification of the periphery of the anterior and/or posterior segment of the right upper lobe with air bronchograms apparent more centrally." Additionally, "Since that time the patient has developed widespread patchy heterogeneous subsegmental opacities concerning for pulmonary edema, aspiration or multifocal pneumonia."   - Will trial Lasix 20 mg IV x1   - Duo-nebs q4 hours  - Consult to HM for increasing leukocytosis   - Given history of AMS and PNA- may be worth while to check for Legionella; no Hyponatremia or Diarrhea in history though  - +/- coverage for atypical's with Azithromycin- consider mycoplasma given CXR with interval worsening     Leukocytosis  - Trend 11 -> 17 -> 22  - Persistently rising (no history of steroids)  - Left shift noted   - On Vanc and Zosyn  - May possibly need Atypical coverage   - Concern for underlying abscess (?); no diarrhea     TAO on CKD III  - BL Cr 2.1  - Cr increased to 2.4 on 8/10   - Will trial Lasix 20 IV given bibasilar rales, will recheck BMP this afternoon to assess   - If trend increases, reasonable to get urine lytes for further evaluation  - Must consider antibiotics as possible culprit as he has been on vanc/zosyn  - Urinating well, but reasonable to assess with renal US in workup if TAO continues       Chronic diastolic congestive heart failure  - Echo with EF of 50-55% on this admission, Grade III DD  - Apparently takes Lasix 20 mg po QD   - Was not on Lasix in the NCC   - Exam with LE edema, bibasilar rales today  - Lasix 20 mg IV x 1 ordered today given interval CXR worsening  - States on home O2- 2-3 L's, no history of COPD "     Constipation  - Scheduled PEG and Senna-Doc     Altered mental state  - See TIA  - Unclear if he received any new medications at OSH prior to this episode; he cannot recall anything in particular   - No recurrent episodes     Chronic atrial fibrillation  - CHADS-Vasc score 5 ; HAS-BLED 2  - Stroke risk factor  - Continue Amiodoraone 200 QD  - Begin Eliquis 5 mg BID     Type 2 diabetes mellitus  Home Regimen: 30 Units every am, SSI with meals (typically will use 4-5 units)   - Weight based: Should be on 30 units long acting and 10 Units TID   - Stroke risk factor  - HgB A1C 7.9 on this admission   - Detemir 10 U's BID per NCC- Increased to 15 BID by myself  - Scheduled Aspart 5 Units TIDM  - POCT glucose q ac&hs       Essential hypertension  - Stroke risk factor  - Currently on Coreg 25 BID and Hydralazine 100 TID  - Given he is an AA male, may be reasonable to trial HCTZ (takes Lasix at home though) vs CCB like amlodipine if uncontrolled     TIA (transient ischemic attack)  68 y/o male being treated for PNA and had epsidoe that lasted a few hours where he was totally unresponsive. He was started on heparin drip and transfer to [facility]. Supposedly TIA but the symptomology does not match up with this. MRI reviewed- no evidence of intracranial pathology.     Antithrombotics: Eliquis 5 mg BID for Atrial fibrillation      Statins: Lipitor 40 mg    Aggressive risk factor modification: HTN, DM, HLD, Diet, Exercise, Obesity, A-Fib, CHF, CKD     Rehab efforts: The patient has been evaluated by a stroke team provider and the therapy needs have been fully considered based off the presenting complaints and exam findings. The following therapy evaluations are needed: None    Diagnostics ordered/pending: None     VTE prophylaxis: Mechanical prophylaxis: Place SCDs  None: Reason for No Pharmacological VTE Prophylaxis: Currently on anticoagulation    BP parameters: unsure if ischemic event normotensive       Patient  accepted as a transfer to [facility] for evaluation of TIA- patient described episode of inability to respond- though aware and conscious of others around him. No control of his entire body or voice for unknown amount of time. No new medications given prior to this episode. Vascular neurology consulted for possible TIA upon arrival. The patient was transferred from [place] with LI heparin gtt. CT/CTA negative from OSH- reviewed by primary team. MRI at [facility] negative for acute intracranial process- reviewed by vascular neurology. Patient without evidence of TIA or stroke on exam- no focal deficits- NIHSS of 0. As such, vascular neurology has no recommendations for this patient from a stroke standpoint as this was effectively ruled out via imaging. Patient remains on vanc and zosyn for CAP- was originally admitted to hospital for such. On home oxygen of 2 L's at baseline per patient, but states for his CHF (?). Denies smoking history or history of COPD. Apparently from [place] where he is a  and was in [place] for vacation when he developed fevers, chills and fatigue. States was COVID negative. Notably history positive for a fib but not previously on AC. Unclear if prior bleed history given transfer and vascular neurology acting as a consultant service. Recommend that LI heparin be DC'ed and Eliquis 5 mg BID be started as the patient has a CHADS-Vasc score of 5 and HAS-BLED score of 2.     On 8/9/2020- patient to be stepped down from [facility] team. Patient on Day 4/5 of Vanc/Zosyn for CAP (could possibly be de-escalated to typical CAP coverage though with persistent increasing leukocytosis). Patient's MRI reviewed with no evidence of acute intracranial abnormalities. Evidence on chronic microvascular changes. Increasing leukocytosis. Workup per primary with amylase, lipase, and D-dimer relatively unremarkable except D- dimer elevated at 0.93. Patient stepped down to vascular neurology on 8/10.     Hospital medicine consulted for  persistent leukocytosis and for potential transfer to  primary for CAP.     STROKE DOCUMENTATION        NIH Scale:  1a. Level of Consciousness: 0-->Alert, keenly responsive  1b. LOC Questions: 0-->Answers both questions correctly  1c. LOC Commands: 0-->Performs both tasks correctly  2. Best Gaze: 0-->Normal  3. Visual: 0-->No visual loss  4. Facial Palsy: 0-->Normal symmetrical movements  5a. Motor Arm, Left: 0-->No drift, limb holds 90 (or 45) degrees for full 10 secs  5b. Motor Arm, Right: 0-->No drift, limb holds 90 (or 45) degrees for full 10 secs  6a. Motor Leg, Left: 0-->No drift, leg holds 30 degree position for full 5 secs  6b. Motor Leg, Right: 0-->No drift, leg holds 30 degree position for full 5 secs  7. Limb Ataxia: 0-->Absent  8. Sensory: 0-->Normal, no sensory loss  9. Best Language: 0-->No aphasia, normal  10. Dysarthria: 0-->Normal  11. Extinction and Inattention (formerly Neglect): 0-->No abnormality  Total (NIH Stroke Scale): 0       Modified Gregory Score: 0  Inez Coma Scale:    ABCD2 Score:    [id number] Score:   HAS -BLED Score:   ICH Score:   Hunt & Castillo Classification:      Hemorrhagic change of an Ischemic Stroke: Does this patient have an ischemic stroke with hemorrhagic changes? No     Neurologic Chief Complaint: Unresponsive- describes episode of being awake and aware of those around him but unable to move arms or legs, speak    Subjective:     Interval History: Patient is seen for follow-up neurological assessment and treatment recommendations:     JAKE. NIHSS remains 0. Continuing antibiotics for CAP, but with persistent increasing leukocytosis. Blood cultures remain NGTD. Respiratory culture unremarkable. Repeat respiratory culture ordered. UA and reflux pending as well. No diarrhea. Mild abdominal pain to right side- passing gas though last BM 2 days ago- not uncommon for him. Day 5 of Vanc/Zosyn (including OSH doses- per patient, unclear as no records).     HPI, Past Medical,  Family, and Social History remains the same as documented in the initial encounter.     Review of Systems   Gastrointestinal: Positive for abdominal pain (right side, mild 3/10) and constipation (perhaps). Negative for diarrhea, nausea and vomiting.   Musculoskeletal: Negative for arthralgias, back pain and myalgias.   Skin: Negative for rash and wound.   Neurological: Negative for dizziness, weakness and headaches.   Psychiatric/Behavioral: Positive for decreased concentration. Negative for agitation and confusion. Hallucinations:    The patient is not nervous/anxious.      Scheduled Meds:   albuterol-ipratropium  3 mL Nebulization Q4H    amiodarone  200 mg Oral Daily    apixaban  5 mg Oral BID    aspirin  81 mg Oral Daily    atorvastatin  80 mg Oral Daily    carvediloL  25 mg Oral BID    famotidine  20 mg Oral BID    hydrALAZINE  100 mg Oral Q8H    insulin aspart U-100  5 Units Subcutaneous TIDWM    insulin detemir U-100  15 Units Subcutaneous BID    piperacillin-tazobactam (ZOSYN) IVPB  4.5 g Intravenous Q8H    polyethylene glycol  17 g Oral Daily    senna-docusate 8.6-50 mg  1 tablet Oral BID    silodosin  8 mg Oral Daily    vancomycin (VANCOCIN) IVPB  1,500 mg Intravenous Q24H     Continuous Infusions:  PRN Meds:acetaminophen, albuterol-ipratropium, dextrose 50%, dextrose 50%, glucagon (human recombinant), glucose, glucose, insulin aspart U-100, ondansetron, Pharmacy to dose Vancomycin consult **AND** vancomycin - pharmacy to dose    Objective:     Vital Signs (Most Recent):  Temp: 97.7 °F (36.5 °C) (08/10/20 0832)  Pulse: 76 (08/10/20 1212)  Resp: (!) 21 (08/10/20 1212)  BP: (!) 143/77 (08/10/20 0832)  SpO2: (!) 93 % (08/10/20 1212)  BP Location: Left arm    Vital Signs Range (Last 24H):  Temp:  [97.7 °F (36.5 °C)-98.7 °F (37.1 °C)]   Pulse:  []   Resp:  [16-27]   BP: (141-163)/(76-83)   SpO2:  [90 %-100 %]   BP Location: Left arm    Physical Exam  Vitals signs and nursing note reviewed.    Constitutional:       General: He is not in acute distress.     Appearance: Normal appearance. He is obese. He is not ill-appearing.   HENT:      Head: Normocephalic and atraumatic.   Eyes:      General: No scleral icterus.     Pupils: Pupils are equal, round, and reactive to light.   Cardiovascular:      Rate and Rhythm: Normal rate. Rhythm irregular.      Pulses: Normal pulses.           Radial pulses are 2+ on the right side and 2+ on the left side.   Pulmonary:      Effort: No respiratory distress.      Breath sounds: Wheezing and rales (bibasilar) present.      Comments: Using some accessory muscles  O2 to 5 L's NC  Abdominal:      General: Bowel sounds are normal. There is no distension.      Palpations: Abdomen is soft.      Tenderness: There is abdominal tenderness (right side). There is no guarding or rebound.      Comments: Negative Brito's sign  Negative McBurney's point    Musculoskeletal:         General: No tenderness or deformity.      Right lower leg: Edema present.      Left lower leg: Edema present.   Skin:     General: Skin is warm and dry.      Coloration: Skin is not jaundiced.      Comments: No evidence of foot ulcers or infection in intertriginous areas of bilateral feet   Neurological:      General: No focal deficit present.      Mental Status: He is alert and oriented to person, place, and time. Mental status is at baseline.      Cranial Nerves: No cranial nerve deficit.      Sensory: No sensory deficit.      Motor: No weakness.      Coordination: Coordination normal.   Psychiatric:         Mood and Affect: Mood normal.         Behavior: Behavior normal.         Neurological Exam:   LOC: alert  Attention Span: Good   Language: No aphasia  Articulation: No dysarthria  Orientation: Person, Place, Time   Visual Fields: Full  EOM (CN III, IV, VI): Full/intact  Pupils (CN II, III): PERRL  Facial Sensation (CN V): Normal  Facial Movement (CN VII): Symmetric facial expression    Motor: Arm left   Normal 5/5  Leg left  Normal 5/5  Arm right  Normal 5/5  Leg right Normal 5/5  Sensation: Intact to light touch, temperature and vibration  Tone: Normal tone throughout    Laboratory:  CBC:   Recent Labs   Lab 08/10/20  0604   WBC 22.71*   RBC 3.64*   HGB 9.8*   HCT 30.4*      MCV 84   MCH 26.9*   MCHC 32.2     Lipid Panel:   Recent Labs   Lab 08/08/20  0115   CHOL 89*   LDLCALC 50.6*   HDL 25*   TRIG 67     Hgb A1C:   Recent Labs   Lab 08/08/20  0115   HGBA1C 7.9*     TSH:   Recent Labs   Lab 08/08/20  0115   TSH 0.370*       Diagnostic Results     Brain Imaging/Vessel Imaging     MRI Brain without Contrast 8/8/2020  No acute intracranial MR abnormalities.  Images chronic microvascular ischemic changes.    Cardiac Imaging     TTE 8/9/2020  · Technically challenging portable study.  · Low normal left ventricular systolic function. The estimated ejection fraction is 50-55%.  · Grade III (severe) left ventricular diastolic dysfunction consistent with restrictive physiology.  · Mild mitral regurgitation.  · Normal right ventricular systolic function.  · Severe left atrial enlargement.  · Small pericardial effusion.      Britta Guerra DO  Comprehensive Stroke Center  Department of Vascular Neurology   [facility]

## 2020-08-10 NOTE — PLAN OF CARE
Problem: Adult Inpatient Plan of Care  Goal: Plan of Care Review  Outcome: Ongoing, Progressing     Problem: Diabetes Comorbidity  Goal: Blood Glucose Level Within Desired Range  Outcome: Ongoing, Progressing     Problem: Bowel Elimination Impaired (Stroke, Ischemic/Transient Ischemic Attack)  Goal: Effective Bowel Elimination  Outcome: Ongoing, Progressing

## 2020-08-10 NOTE — PT/OT/SLP PROGRESS
Physical Therapy      Patient Name:  Amadeo Stevenson   MRN:  [id number]    Patient not seen today secondary to Patient fatigue, Patient unwilling to participate. Will follow-up 8/11/20.    Zohra Crane, ELINOR

## 2020-08-10 NOTE — PLAN OF CARE
ERROL [facility]  CM faxed notes from yesterday and needed a MD name and number for their MD to call to discuss.  Per Susie with Stroke Service, Medicine team will take patient over tomorrow.  Per Susie, they do not want to transfer today.

## 2020-08-10 NOTE — SUBJECTIVE & OBJECTIVE
Past Medical History:   Diagnosis Date    CHF (congestive heart failure), NYHA class II, acute, combined     Chronic a-fib     CKD (chronic kidney disease)     Diabetes mellitus type 2, uncontrolled, with complications     Gout     HTN (hypertension), benign      History reviewed. No pertinent surgical history.  Review of patient's allergies indicates:  No Known Allergies    Scheduled Medications:    albuterol-ipratropium  3 mL Nebulization Q4H    amiodarone  200 mg Oral Daily    apixaban  5 mg Oral BID    aspirin  81 mg Oral Daily    atorvastatin  80 mg Oral Daily    carvediloL  25 mg Oral BID    famotidine  20 mg Oral BID    hydrALAZINE  100 mg Oral Q8H    insulin aspart U-100  5 Units Subcutaneous TIDWM    insulin detemir U-100  15 Units Subcutaneous BID    piperacillin-tazobactam (ZOSYN) IVPB  4.5 g Intravenous Q8H    polyethylene glycol  17 g Oral Daily    senna-docusate 8.6-50 mg  1 tablet Oral BID    silodosin  8 mg Oral Daily    vancomycin (VANCOCIN) IVPB  1,500 mg Intravenous Q24H       PRN Medications: acetaminophen, dextrose 50%, dextrose 50%, glucagon (human recombinant), glucose, glucose, insulin aspart U-100, ondansetron, Pharmacy to dose Vancomycin consult **AND** vancomycin - pharmacy to dose    Family History     None        Tobacco Use    Smoking status: Never Smoker    Smokeless tobacco: Never Used   Substance and Sexual Activity    Alcohol use: Not on file    Drug use: Not on file    Sexual activity: Not on file     Review of Systems   Constitutional: Positive for activity change. Negative for fatigue and fever.   HENT: Negative for trouble swallowing and voice change.    Eyes: Negative for photophobia and visual disturbance.   Respiratory: Positive for shortness of breath. Negative for cough.    Cardiovascular: Negative for chest pain and palpitations.   Gastrointestinal: Negative for nausea and vomiting.   Genitourinary: Negative for difficulty urinating and flank  pain.   Musculoskeletal: Positive for gait problem. Negative for arthralgias.   Skin: Negative for color change and rash.   Neurological: Positive for weakness. Negative for facial asymmetry, speech difficulty and numbness.   Psychiatric/Behavioral: Negative for agitation and confusion.     Objective:     Vital Signs (Most Recent):  Temp: 97.7 °F (36.5 °C) (08/10/20 0832)  Pulse: 77 (08/10/20 0856)  Resp: 17 (08/10/20 0856)  BP: (!) 143/77 (08/10/20 0832)  SpO2: (!) 93 % (08/10/20 0856)    Vital Signs (24h Range):  Temp:  [97.7 °F (36.5 °C)-98.7 °F (37.1 °C)] 97.7 °F (36.5 °C)  Pulse:  [] 77  Resp:  [16-41] 17  SpO2:  [90 %-100 %] 93 %  BP: (134-174)/(69-83) 143/77     Body mass index is 41.57 kg/m².    Physical Exam  Constitutional:       General: He is not in acute distress.     Appearance: He is well-developed.   HENT:      Head: Normocephalic and atraumatic.   Eyes:      General:         Right eye: No discharge.         Left eye: No discharge.   Neck:      Musculoskeletal: Neck supple.   Cardiovascular:      Pulses: Normal pulses.   Chest:      Comments: Mild increased effort   On NC 2L  Abdominal:      General: There is no distension.      Palpations: Abdomen is soft.   Musculoskeletal:         General: No deformity.   Skin:     General: Skin is warm and dry.   Neurological:      Mental Status: He is alert and oriented to person, place, and time.      Cranial Nerves: No dysarthria.      Motor: No weakness.      Comments: Follows commands    Psychiatric:         Behavior: Behavior normal.         Cognition and Memory: Cognition is not impaired.       NEUROLOGICAL EXAMINATION:     MENTAL STATUS   Oriented to person, place, and time.       Diagnostic Results:  Labs: Reviewed  X-Ray: Reviewed  US: Reviewed  MRI: Reviewed

## 2020-08-10 NOTE — PLAN OF CARE
Problem: SLP Goal  Goal: SLP Goal  Description: Speech-Language Pathology Goals  Goals to be met by 8/16/20  1. Patient will tolerate a regular diet/thin liquids with no s/s of aspiration.   2. Patient will participate in a speech/language/cognitive eval. -ongoing  3. Pt will answer orientation questions x4 with 90%$ acc.   4. Pt will recall functional information after a 2+ minute delay given min cues.   5. Pt will name 5-8 items in a concrete category in 60 seconds given mod cues.   Outcome: Ongoing, Progressing  [id number] Evaluation initiated. Patient lethargic with significantly delayed responses and requiring moderate stimulation to remain awake/alert. ST will continue to follow.   OSVALDO Olson., CCC-SLP  Pager: [phone]  08/10/2020

## 2020-08-10 NOTE — ASSESSMENT & PLAN NOTE
"- Vanc/Zosyn Day 5 (Day 3 at [facility]- unclear if he received antibiotics at OSH- states he did for 2 days before transfer for higher level of care)  - CXR repeated today- "dense opacification of the periphery of the anterior and/or posterior segment of the right upper lobe with air bronchograms apparent more centrally." Additionally, "Since that time the patient has developed widespread patchy heterogeneous subsegmental opacities concerning for pulmonary edema, aspiration or multifocal pneumonia."   - Will trial Lasix 20 mg IV x1   - Duo-nebs q4 hours  - Consult to  for increasing leukocytosis   - Given history of AMS and PNA- may be worth while to check for Legionella; no Hyponatremia or Diarrhea in history though  - +/- coverage for atypical's with Azithromycin- consider mycoplasma given CXR with interval worsening   "

## 2020-08-10 NOTE — HOSPITAL COURSE
08/8/2020: Bed mobility Min-SBA.  Sit to stand CGA-SBA.  Ambulated 12 ft CGA.  UBD Kal and LBD Moda.  8/9: Passed bedside swallow evaluation.  SLP recommending regular diet and thin liquids. Cog-ling eval pending.

## 2020-08-10 NOTE — ASSESSMENT & PLAN NOTE
- Echo with EF of 50-55% on this admission, Grade III DD  - Apparently takes Lasix 20 mg po QD   - Was not on Lasix in the [facility]   - Exam with LE edema, bibasilar rales today  - Lasix 20 mg IV x 1 ordered today given interval CXR worsening  - States on home O2- 2-3 L's, no history of COPD

## 2020-08-10 NOTE — PROGRESS NOTES
Pharmacokinetic Assessment Follow Up: IV Vancomycin    Vancomycin serum concentration assessment(s):    -Vancomycin trough was drawn appropriately and can be used to guide therapy.  -A level of 22.2 mcg/mL is above therapeutic goal of 15-20 mcg/mL for pneumonia.  -Worsening renal function, Scr 2.1 > 2.4 mg/dL.    -ke=0.036 hr-1  -T 1/2 = 19.1 hrs    Vancomycin Regimen Plan:    -Hold vancomycin dosing today.   -Obtain vancomycin random level with AM labs - pulse dosing in setting of renal impairment.  -Estimated that patient will be slightly below therapeutic range tomorrow morning.  -Re-dose for levels < 20 mcg/mL.      Drug levels (last 3 results):  Recent Labs   Lab Result Units 08/08/20  0934 08/10/20  1324   Vancomycin, Random ug/mL 13.0  --    Vancomycin-Trough ug/mL  --  22.2*       Pharmacy will continue to follow and monitor vancomycin.    Please contact pharmacy at extension [phone] for questions regarding this assessment.    Thank you for the consult,   Davey Diggs       Patient brief summary:  Amadeo Stevenson is a 69 y.o. male initiated on antimicrobial therapy with IV Vancomycin for treatment of lower respiratory infection    Drug Allergies:   Review of patient's allergies indicates:  No Known Allergies    Actual Body Weight:   127.7 kg    Renal Function:   Estimated Creatinine Clearance: 38.4 mL/min (A) (based on SCr of 2.4 mg/dL (H)).,     Dialysis Method (if applicable):  N/A    CBC (last 72 hours):  Recent Labs   Lab Result Units 08/08/20  0115 08/09/20  0409 08/10/20  0604   WBC K/uL 11.76  11.76 19.45* 22.71*   Hemoglobin g/dL 10.2*  10.2* 10.6* 9.8*   Hemoglobin A1C % 7.9*  --   --    Hematocrit % 32.1*  32.1* 33.5* 30.4*   Platelets K/uL 152  152 200 191   Gran% % 94.4*  94.4* 88.0* 86.0*   Lymph% % 2.9*  2.9* 3.0* 3.2*   Mono% % 2.2*  2.2* 8.3 7.5   Eosinophil% % 0.0  0.0 0.0 0.0   Basophil% % 0.1  0.1 0.1 0.1   Differential Method  Automated  Automated Automated Automated       Metabolic  Panel (last 72 hours):  Recent Labs   Lab Result Units 08/07/20  2345 08/08/20  0115 08/08/20  0315 08/09/20  0409 08/10/20  0605   Sodium mmol/L 138  --   --  139 139   Potassium mmol/L 4.6  --   --  3.8 4.1   Chloride mmol/L 101  --   --  102 101   CO2 mmol/L 26  --   --  28 26   Glucose mg/dL 337*  --   --  106 229*   Glucose, UA   --   --  1+*  1+*  --   --    BUN, Bld mg/dL 58*  --   --  62* 68*   Creatinine mg/dL 2.1*  --   --  2.1* 2.4*   Albumin g/dL 2.5*  --   --  2.6* 2.2*   Total Bilirubin mg/dL 0.7  --   --  0.7 0.9   Alkaline Phosphatase U/L 89  --   --  95 95   AST U/L 25  --   --  27 31   ALT U/L 26  --   --  25 29   Magnesium mg/dL  --  2.5  --  2.8* 2.8*   Phosphorus mg/dL  --  3.9  --  3.3 4.4       Vancomycin Administrations:  vancomycin given in the last 96 hours                   vancomycin 1.5 g in dextrose 5 % 250 mL IVPB (ready to mix) (mg) 1,500 mg New Bag 08/09/20 1345     1,500 mg New Bag 08/08/20 1324                Microbiologic Results:  Microbiology Results (last 7 days)     Procedure Component Value Units Date/Time    Culture, Respiratory with Gram Stain [[id number]]     Order Status: No result Specimen: Respiratory     Culture, Respiratory with Gram Stain [[id number]] Collected: 08/08/20 0840    Order Status: Completed Specimen: Respiratory from Sputum, Expectorated Updated: 08/10/20 0747     Respiratory Culture Normal respiratory michaela      No S aureus or Pseudomonas isolated.     Gram Stain (Respiratory) <10 epithelial cells per low power field.     Gram Stain (Respiratory) Rare WBC's     Gram Stain (Respiratory) Rare yeast    Narrative:      Mini-BAL.    Blood culture [[id number]] Collected: 08/08/20 0144    Order Status: Completed Specimen: Blood from Peripheral, Upper Arm, Left Updated: 08/10/20 0613     Blood Culture, Routine No Growth to date      No Growth to date      No Growth to date    Narrative:      Blood cultures x 2 different sites. 4 bottles total. Please  draw  cultures before administering antibiotics.    Blood culture [[id number]] Collected: 08/08/20 0136    Order Status: Completed Specimen: Blood from Peripheral, Lower Arm, Left Updated: 08/10/20 0613     Blood Culture, Routine No Growth to date      No Growth to date      No Growth to date    Narrative:      Blood cultures from 2 different sites. 4 bottles total.  Please draw before starting antibiotics.

## 2020-08-11 NOTE — RESPIRATORY THERAPY
Rapid Response Respiratory Therapy Proactive Rounding Note      Time of visit: 919     Code Status: Full Code   : 1950  Bed: 950/950 A:   MRN: [id number]    SITUATION     Evaluated patient for: Non-Invasive Positive Pressure Ventilation Compliance     BACKGROUND     Patient has a past medical history of CHF (congestive heart failure), NYHA class II, acute, combined, Chronic a-fib, CKD (chronic kidney disease), Diabetes mellitus type 2, uncontrolled, with complications, Gout, and HTN (hypertension), benign.    ASSESSMENT/INTERVENTIONS     Upon arrival in room, pt found in bed on CPAP of 10 35%. No respiratory distress or shortness of breath noted. Will continue to monitor.     Pulse: 68 Respiratory rate: 15 SpO2: 98%   Level of Consciousness: Level of Consciousness (AVPU): alert  Respiratory Effort: Respiratory Effort: Normal, Unlabored Expansion/Accessory Muscle Usage: Expansion/Accessory Muscles/Retractions: expansion symmetric  All Lung Field Breath Sounds: All Lung Fields Breath Sounds: Anterior:, Lateral:, diminished  O2 Device/Concentration: CPAP 35%  Most recent blood gas:   Recent Labs     08/10/20  1538   PH 7.432   PCO2 42.2   PO2 74*   HCO3 28.1*   POCSATURATED 95   BE 4     NIPPV: Yes, Type: CPAP  Settings: 10, 35%   Ambu at bedside: Ambu bag with the patient?: Yes, Adult Ambu    Current Respiratory Care Orders:   08/10/20 1613  CPAP Until Discontinued Until discontinued     Question: Expiratory pressure: Answer: 10    08/10/20 1612     20 0008  Pulse Oximetry Q4H Every 4 hours (25 of 42 released)    Release    20 0007   20 0000  Inhalation Treatment Q4H Every 4 hours (25 of 42 released)    Release    20 2345   Unscheduled  Inhalation Treatment Q4H PRN Every 4 hours PRN (0 of 49776 released)    Release    08/10/20 1158           RECOMMENDATIONS     We recommend: Placing pt on heated wire circuit for comfort since pt has been on CPAP for >8 hrs.     ESCALATION       Physician Escalation (Yes/No) No     Discussed plan of care primary RT, June     FOLLOW-UP     Please call back the Rapid Response RT, Nandini Goodwin, RRT at x [phone] for any questions or concerns.

## 2020-08-11 NOTE — PROGRESS NOTES
Pharmacokinetic Assessment Follow Up: IV Vancomycin    Vancomycin serum concentration assessment(s):  -Vancomycin trough was drawn appropriately and can be used to guide therapy  -A level of 19.1 mcg/mL is within therapeutic goal of 15-20 mcg/mL for pneumonia (HAP)  -Worsening renal function, Scr 2.1 > 2.4 > 3.1 mg/dL     Vancomycin Regimen Plan:  -Will re-dose with 1 g x 1 dose today   -Obtain vancomycin random level with AM labs - pulse dosing in setting of renal impairment  -Re-dose for levels < 20 mcg/mL.    Drug levels (last 3 results):  Recent Labs   Lab Result Units 08/10/20  1324 08/11/20  0420   Vancomycin, Random ug/mL  --  19.1   Vancomycin-Trough ug/mL 22.2*  --        Pharmacy will continue to follow and monitor vancomycin.    Please contact pharmacy at extension [phone] for questions regarding this assessment.    Thank you for the consult,   Beryl Craft       Patient brief summary:  Amadeo Stevenson is a 69 y.o. male initiated on antimicrobial therapy with IV Vancomycin for treatment of lower respiratory infection (HAP)    Drug Allergies:   Review of patient's allergies indicates:  No Known Allergies    Renal Function:   Estimated Creatinine Clearance: 29.7 mL/min (A) (based on SCr of 3.1 mg/dL (H)).,     CBC (last 72 hours):  Recent Labs   Lab Result Units 08/09/20  0409 08/10/20  0604 08/11/20  0420   WBC K/uL 19.45* 22.71* 19.80*   Hemoglobin g/dL 10.6* 9.8* 9.1*   Hematocrit % 33.5* 30.4* 28.4*   Platelets K/uL 200 191 182   Gran% % 88.0* 86.0* 87.2*   Lymph% % 3.0* 3.2* 3.7*   Mono% % 8.3 7.5 8.1   Eosinophil% % 0.0 0.0 0.1   Basophil% % 0.1 0.1 0.1   Differential Method  Automated Automated Automated       Metabolic Panel (last 72 hours):  Recent Labs   Lab Result Units 08/09/20  0409 08/10/20  0605 08/10/20  1228 08/10/20  1514 08/11/20  0420   Sodium mmol/L 139 139  --  140 137   Potassium mmol/L 3.8 4.1  --  4.9 4.2   Chloride mmol/L 102 101  --  103 100   CO2 mmol/L 28 26  --  22* 26    Glucose mg/dL 106 229*  --  195* 122*   Glucose, UA   --   --  Negative  --   --    BUN, Bld mg/dL 62* 68*  --  78* 82*   Creatinine mg/dL 2.1* 2.4*  --  3.1* 3.1*   Albumin g/dL 2.6* 2.2*  --   --  2.0*   Total Bilirubin mg/dL 0.7 0.9  --   --  0.9   Alkaline Phosphatase U/L 95 95  --   --  111   AST U/L 27 31  --   --  77*   ALT U/L 25 29  --   --  54*   Magnesium mg/dL 2.8* 2.8*  --   --  3.6*   Phosphorus mg/dL 3.3 4.4  --   --  4.6*       Vancomycin Administrations:  vancomycin given in the last 96 hours                   vancomycin 1.5 g in dextrose 5 % 250 mL IVPB (ready to mix) (mg) 1,500 mg New Bag 08/09/20 1345     1,500 mg New Bag 08/08/20 1324                Microbiologic Results:  Microbiology Results (last 7 days)     Procedure Component Value Units Date/Time    Blood culture [[id number]] Collected: 08/08/20 0144    Order Status: Completed Specimen: Blood from Peripheral, Upper Arm, Left Updated: 08/11/20 0613     Blood Culture, Routine No Growth to date      No Growth to date      No Growth to date      No Growth to date    Narrative:      Blood cultures x 2 different sites. 4 bottles total. Please  draw cultures before administering antibiotics.    Blood culture [[id number]] Collected: 08/08/20 0136    Order Status: Completed Specimen: Blood from Peripheral, Lower Arm, Left Updated: 08/11/20 0613     Blood Culture, Routine No Growth to date      No Growth to date      No Growth to date      No Growth to date    Narrative:      Blood cultures from 2 different sites. 4 bottles total.  Please draw before starting antibiotics.    Culture, Respiratory with Gram Stain [[id number]]     Order Status: No result Specimen: Respiratory     Culture, Respiratory with Gram Stain [[id number]] Collected: 08/08/20 0840    Order Status: Completed Specimen: Respiratory from Sputum, Expectorated Updated: 08/10/20 0747     Respiratory Culture Normal respiratory michaela      No S aureus or Pseudomonas isolated.     Gram Stain  (Respiratory) <10 epithelial cells per low power field.     Gram Stain (Respiratory) Rare WBC's     Gram Stain (Respiratory) Rare yeast    Narrative:      Mini-BAL.

## 2020-08-11 NOTE — ASSESSMENT & PLAN NOTE
"Subjective:      Abelardo Schaeffer is a 18 m.o. male who presents with Cough            HPI    Abelardo presents with mom who is the historian.  Barky cough x 2 days, brother with croup at home.  Raspy voice. Runny nose and congestion x few days.  Decreased appetite, drinking fluids. +wet diapers.   Cough is worse at night.   Denies vomiting, diarrhea, ear pain, ear discharge, wheezing or shortness of breath.     ROS  See above. All other systems reviewed and negative.   Objective:     Pulse 132   Temp 37.7 °C (99.9 °F)   Resp 28   Ht 0.794 m (2' 7.25\")   Wt 10.3 kg (22 lb 9.6 oz)   SpO2 97%   BMI 16.27 kg/m²      Physical Exam   Constitutional: He appears well-developed and well-nourished. He is active.   HENT:   Right Ear: Tympanic membrane normal.   Left Ear: Tympanic membrane normal.   Nose: Rhinorrhea and congestion present.   Mouth/Throat: No oral lesions. Pharynx erythema present. No oropharyngeal exudate. Pharynx is abnormal.   Eyes: Pupils are equal, round, and reactive to light. EOM are normal. Right eye exhibits no discharge. Left eye exhibits no discharge.   Neck: Normal range of motion. Neck supple.   Cardiovascular: Normal rate, regular rhythm, S1 normal and S2 normal.    Pulmonary/Chest: Effort normal and breath sounds normal. Stridor (barky cough ) present. No respiratory distress. He has no wheezes.   Abdominal: Soft. Bowel sounds are normal.   Musculoskeletal: Normal range of motion.   Lymphadenopathy:     He has no cervical adenopathy.   Neurological: He is alert.   Skin: Skin is warm and dry. Capillary refill takes less than 2 seconds. No rash noted.     Assessment/Plan:     1. Croup  Parent & patient educated on the etiology & pathogenesis of croup. We discussed the natural history of viral infections and the likely length of infection. Parent cautioned that child should be considered contagious for 3 days following onset of illness and until afebrile. We discussed the use of steam treatment, " - continue with amiodarone, coreg, and apixaban    either through a humidifier, or by sitting in the bathroom after running a bath/shower. We discussed using methods to calm the child & reduce crying and/or anxiety which may worsen the stridor. Alternative treatment methods include: Tylenol/Ibuprofen prn fever or discomfort, encourage PO liquid intake, elevate the head of bed (an infant can be placed in a car seat/pillows can be used for an older child), and avoid environmental irritants, such as smoke. RTC/ER/PAHC for any increased WOB, retractions, worsening of the cough, difficulty breathing, fever >4d, or for any other concerns. Parent verbalized an understanding of this plan.     Start PO dex if barky cough remains by Friday x 1 dose.   - dexamethasone (DECADRON) injection (check route below) 6 mg; 0.6 mL by Intramuscular route Once.  - dexamethasone (DECADRON) 4 MG Tab; Take 1.5 Tabs by mouth 2 times a day for 2 days.  Dispense: 6 Tab; Refill: 0

## 2020-08-11 NOTE — CONSULTS
"[facility] Hospital Medicine  Consult Note    Patient Name: Amadeo Stevenson  MRN: [id number]  Admission Date: 8/7/2020  Hospital Length of Stay: 3 days  Attending Physician: Finesse Webster MD   Primary Care Provider: To Obtain Unable           Patient information was obtained from patient, past medical records and ER records.     Inpatient consult to Hospital Medicine - Stroke Comanagement  Consult performed by: Dony Amaya MD  Consult ordered by: Britta Guerra DO        Subjective:     Principal Problem: Acute hypoxemic respiratory failure    Chief Complaint: No chief complaint on file.       HPI: 68 y/o M with HTN, Atrial Fibrillation, HLD, DM2, CKD, Gout, HFpEF who was admitted to OSH for RUL Pneumonia 3 days ago who presented to [facility] with stroke like symptoms from OSH. Imaging was negative for acute stroke and he was diagnosed with a TIA. He had resolution of all acute neurological deficits.   Hospital Medicine was consulted for "CAP- leukocytosis worsening, no diarrhea, no focal exam findings, on vanc/zosyn- day 5 today"    Past Medical History:   Diagnosis Date    CHF (congestive heart failure), NYHA class II, acute, combined     Chronic a-fib     CKD (chronic kidney disease)     Diabetes mellitus type 2, uncontrolled, with complications     Gout     HTN (hypertension), benign        History reviewed. No pertinent surgical history.    Review of patient's allergies indicates:  No Known Allergies    No current facility-administered medications on file prior to encounter.      Current Outpatient Medications on File Prior to Encounter   Medication Sig    calcipotriene (DOVONOX) 0.005 % cream Apply topically 2 (two) times daily.    clindamycin (CLEOCIN T) 1 % lotion Apply topically 2 (two) times daily.    clobetasol 0.05% (TEMOVATE) 0.05 % Oint Apply topically 2 (two) times daily.    latanoprost 0.005 % ophthalmic solution Place 1 drop into both eyes every evening.    " tacrolimus (PROTOPIC) 0.1 % ointment Apply topically 2 (two) times daily.     Family History     None        Tobacco Use    Smoking status: Never Smoker    Smokeless tobacco: Never Used   Substance and Sexual Activity    Alcohol use: Not on file    Drug use: Not on file    Sexual activity: Not on file     Review of Systems   Constitutional: Negative for chills and fever.   HENT: Negative for congestion and rhinorrhea.    Respiratory: Positive for shortness of breath and wheezing.    Cardiovascular: Positive for leg swelling. Negative for chest pain and palpitations.   Gastrointestinal: Positive for abdominal pain (right side, mild 3/10) and constipation (perhaps). Negative for diarrhea, nausea and vomiting.   Musculoskeletal: Negative for arthralgias, back pain and myalgias.   Skin: Negative for rash and wound.   Neurological: Negative for dizziness, weakness and headaches.   Psychiatric/Behavioral: Positive for decreased concentration. Negative for agitation and confusion. Hallucinations:    The patient is not nervous/anxious.      Objective:     Vital Signs (Most Recent):  Temp: 99.7 °F (37.6 °C) (08/10/20 1542)  Pulse: 74 (08/10/20 1732)  Resp: 17 (08/10/20 1732)  BP: 130/70 (08/10/20 1542)  SpO2: 96 % (08/10/20 1732) Vital Signs (24h Range):  Temp:  [97.7 °F (36.5 °C)-99.7 °F (37.6 °C)] 99.7 °F (37.6 °C)  Pulse:  [] 74  Resp:  [16-27] 17  SpO2:  [90 %-98 %] 96 %  BP: (130-163)/(70-83) 130/70     Weight: 127.7 kg (281 lb 8.4 oz)  Body mass index is 41.57 kg/m².    Physical Exam  Vitals signs and nursing note reviewed.   Constitutional:       General: He is not in acute distress.     Appearance: Normal appearance. He is obese. He is not ill-appearing.   HENT:      Head: Normocephalic and atraumatic.   Eyes:      General: No scleral icterus.     Pupils: Pupils are equal, round, and reactive to light.   Cardiovascular:      Rate and Rhythm: Normal rate. Rhythm irregular.      Pulses: Normal pulses.            Radial pulses are 2+ on the right side and 2+ on the left side.   Pulmonary:      Effort: No respiratory distress.      Breath sounds: Wheezing and rales (bibasilar) present.      Comments: Using some accessory muscles  O2 to 5 L's NC  Abdominal:      General: Bowel sounds are normal. There is no distension.      Palpations: Abdomen is soft.      Tenderness: There is no guarding or rebound.      Comments: Negative Brito's sign  Negative McBurney's point    Musculoskeletal:         General: No tenderness or deformity.      Right lower leg: Edema present.      Left lower leg: Edema present.   Skin:     General: Skin is warm and dry.      Coloration: Skin is not jaundiced.      Comments: No evidence of foot ulcers or infection in intertriginous areas of bilateral feet   Neurological:      General: No focal deficit present.      Mental Status: He is alert and oriented to person, place, and time. Mental status is at baseline.      Cranial Nerves: No cranial nerve deficit.      Sensory: No sensory deficit.      Motor: No weakness.      Coordination: Coordination normal.   Psychiatric:         Mood and Affect: Mood normal.         Behavior: Behavior normal.         Significant Labs:   ABGs:   Recent Labs   Lab 08/10/20  1538   PH 7.432   PCO2 42.2   HCO3 28.1*   POCSATURATED 95   BE 4     CBC:   Recent Labs   Lab 08/09/20  0409 08/10/20  0604   WBC 19.45* 22.71*   HGB 10.6* 9.8*   HCT 33.5* 30.4*    191     CMP:   Recent Labs   Lab 08/09/20  0409 08/10/20  0605 08/10/20  1514    139 140   K 3.8 4.1 4.9    101 103   CO2 28 26 22*    229* 195*   BUN 62* 68* 78*   CREATININE 2.1* 2.4* 3.1*   CALCIUM 8.8 8.5* 8.7   PROT 7.1 6.5  --    ALBUMIN 2.6* 2.2*  --    BILITOT 0.7 0.9  --    ALKPHOS 95 95  --    AST 27 31  --    ALT 25 29  --    ANIONGAP 9 12 15   EGFRNONAA 31.2* 26.5* 19.5*     Cardiac Markers:   Recent Labs   Lab 08/10/20  1325   *     Urine Studies:   Recent Labs   Lab  08/10/20  1228   COLORU Yellow   APPEARANCEUA Cloudy*   PHUR 5.0   SPECGRAV 1.020   PROTEINUA 2+*   GLUCUA Negative   KETONESU Negative   BILIRUBINUA Negative   OCCULTUA 3+*   NITRITE Negative   LEUKOCYTESUR Negative   RBCUA >100*   WBCUA 7*   BACTERIA Many*   SQUAMEPITHEL 1   HYALINECASTS 2*     All pertinent labs within the past 24 hours have been reviewed.    Significant Imaging: I have reviewed all pertinent imaging results/findings within the past 24 hours.    Assessment/Plan:     * Acute hypoxemic respiratory failure  Patient w/ HFE & RUL PNA on Vanc and Zosyn requiring 3L NC. Patient has been tachypneic on 8/10/20. Patient noted to be disconnected from supplemental O2 w/ O2 Sats 80% which was responsive to 3L NC and returning to 95%. At the time of my exam patient using accessory muscles and abdomen to breath. Patient appeared uncomfortable. He had wheezes and rales. Patient with worsening leukocytosis and BNP. ABG w/ pH of 7.4, pCO2 of 42.2, and pO2 of 74 on 3L NC. Concerned for atypical PNA vs HF exacerbation vs PE.     - patient placed on CPAP   - started on Azithro 500 mg x 3 days   - discontinued Vanc   - continue Zosyn   - obtain TB skin test   - obtain legionella urine antigen   - consider CT chest w/out contrast if patient fails to improve   - low threshold to obtain V/Q scan to evaluate for PE   - s/p lasix trial; strict I/Os   - daily CMP   - scheduled duo-nebs w/ PRN orders     Community acquired bacterial pneumonia  See Acute Hypoxemic Respiratory Failure     TAO on CKD III  Possible etiology of TAO 2/2 ATN from vanc toxicity. Patient with supratheraputic vanc trough    - CMP daily   - avoid nephrotoxic agents   - renally dose medication when appropriate   - consider obtaining renal ultrasound   - strict I/Os   - consider nephrology consult     Essential hypertension  - continue Coreg 25 mg BID and       Chronic diastolic congestive heart failure  See Acute Hypoxemic Respiratory Failure       Type  2 diabetes mellitus  - continue w/ basal/bolus insulin w/ SSI   - titrate to BG goal 140 - 180   - POCT BG AC/HS       Chronic atrial fibrillation  - continue with amiodarone, coreg, and apixaban         VTE Risk Mitigation (From admission, onward)         Ordered     apixaban tablet 5 mg  2 times daily      08/09/20 1945     IP VTE LOW RISK PATIENT  Once      08/08/20 0007     Place sequential compression device  Until discontinued      08/08/20 0007     Reason for No Pharmacological VTE Prophylaxis  Once     Question:  Reasons:  Answer:  Risk of Bleeding    08/08/20 0007              Thank you for your consult. I will sign off. Please contact us if you have any additional questions.   Patient to transfer to  in the AM    Dony Amaya MD  Department of Hospital Medicine   [facility]                    08/11/2020                             STAFF PHYSICIAN NOTE                                   Attending Attestation for Rounds with Resident  I have reviewed and concur with the resident's history, physical, assessment, and plan.  I have personally interviewed and examined the patient at bedside and agree with the resident's findings.                                     Jill Ortiz MD  Senior Hospitalist  [id number], [phone]

## 2020-08-11 NOTE — ASSESSMENT & PLAN NOTE
Possible etiology of TAO 2/2 ATN from vanc toxicity/sepsis vs cardiorenal vs prerenal. Patient with supratheraputic vanc trough at 22 on 8/10. Vanc trough since normalized.     - CMP daily   - avoid nephrotoxic agents   - renally dose medication when appropriate   - consider obtaining renal ultrasound   - strict I/Os   - consider nephrology consult   - f/u urine pr:cr ratio, urea  - f/u kidney u/s

## 2020-08-11 NOTE — HOSPITAL COURSE
Patient initially admitted to OSH for RUL PNA and was transferred for NCC on 8/8 due to unresponsiveness. CT head, CTA head from OSH negative, MRI and EEG negative here. Symptoms resolved spontaneously and patient treated for TIA. Patient stepped down to vascular neurology 8/9 when he was stable. Was treated with vanc/zosyn for broad coverage of CAP, CXR 8/10 c/f volume overload, labs with TAO and rising SCr. Transferred to hospital medicine for further management of respiratory failure and TAO on 8/11. Pt was given lasix 80 IV x2 w some response, but not massively. Urine studies ordered. Patient placed on intermittent CPAP for assistance with suspected pulmonary edema on CXR. Over the course of the day, patient was somnolent but had periods in which he woke up and was able to answer questions appropriately. He was able to stand up to urinate in a urinal. Blood gases throughout the day unremarkable. Overnight, patient developed some tachypnea and placed back on CPAP. Morning of 8/12 at 8:28am, patient was found unresponsive and code blue was called. Multiple rounds of chest compressions with epi. Patient pronounced dead at 8:51am. Patient's wife was notified during code, events of patient's hospitalization discussed with her on arrival.  available for support throughout.

## 2020-08-11 NOTE — PT/OT/SLP PROGRESS
Speech Language Pathology  Patient Not Seen      Amadeo Stevenson  MRN: [id number]    Patient not seen today secondary to remains on CPAP at this time. SLP to continue to follow.     Emily P. Abadie M.S., CCC-SLP  Speech Language Pathologist  [phone]  08/11/2020

## 2020-08-11 NOTE — ASSESSMENT & PLAN NOTE
Patient w/ HFE & RUL PNA on Vanc and Zosyn requiring 3L NC. Patient has been tachypneic on 8/10/20. Patient noted to be disconnected from supplemental O2 w/ O2 Sats 80% which was responsive to 3L NC and returning to 95%. At the time of my exam patient using accessory muscles and abdomen to breath. Patient appeared uncomfortable. He had wheezes and rales. Patient with worsening leukocytosis and BNP. ABG w/ pH of 7.4, pCO2 of 42.2, and pO2 of 74 on 3L NC. Concerned for atypical PNA vs HF exacerbation vs PE.     - patient placed on CPAP   - started on Azithro 500 mg x 3 days   - discontinued Vanc   - continue Zosyn   - obtain TB skin test   - obtain legionella urine antigen   - consider CT chest w/out contrast if patient fails to improve   - low threshold to obtain V/Q scan to evaluate for PE   - s/p lasix trial; strict I/Os   - daily CMP   - scheduled duo-nebs w/ PRN orders

## 2020-08-11 NOTE — PLAN OF CARE
CM in communication with team regarding transfer of patient to the [facility].  Per message rec'd from Dr. Salcedo, they do not want to transfer today as they would like to see an improvement in the TAO.  CM contacted Kavin Best with the [facility] and provided him with Dr. Salcedo's number for their md to call to discuss to be ready for transfer tomorrow.

## 2020-08-11 NOTE — ASSESSMENT & PLAN NOTE
Patient w/ HFE & RUL PNA on Vanc and Zosyn requiring 3L NC. Patient has been tachypneic on 8/10/20. Patient noted to be disconnected from supplemental O2 w/ O2 Sats 80% which was responsive to 3L NC and returning to 95%. At the time of my exam patient using accessory muscles and abdomen to breath. Patient appeared uncomfortable. He had wheezes and rales. Patient with worsening leukocytosis and BNP. ABG w/ pH of 7.4, pCO2 of 42.2, and pO2 of 74 on 3L NC. Concerned for atypical PNA vs HF exacerbation vs PE. Pt grossly volume overloaded on exam 8/11.     - lasix 80 IV w some UOP response; plan for ramos placement and further diuresis  - plan to wean to NC/CPAP at night  - continue vanc/zosyn/azithro  - f/u TB skin test   - f/u legionella urine antigen   - consider CT chest w/out contrast if patient fails to improve   - low threshold to obtain V/Q scan to evaluate for PE   - strict I/Os   - daily CMP   - scheduled duo-nebs w/ PRN orders

## 2020-08-11 NOTE — SUBJECTIVE & OBJECTIVE
Interval History: Pt stable on CPAP 10/35%. NAEON. VSS.     Review of Systems   Constitutional: Negative for chills and fever.   HENT: Negative for congestion and rhinorrhea.    Respiratory: Positive for shortness of breath and wheezing.    Cardiovascular: Positive for leg swelling. Negative for chest pain and palpitations.   Gastrointestinal: Positive for abdominal pain (right side, mild 3/10) and constipation (no bm x5d). Negative for diarrhea, nausea and vomiting.   Musculoskeletal: Negative for arthralgias, back pain and myalgias.   Skin: Negative for rash and wound.   Neurological: Negative for dizziness, weakness and headaches.   Psychiatric/Behavioral: Positive for decreased concentration. Negative for agitation and confusion. Hallucinations:    The patient is not nervous/anxious.      Objective:     Vital Signs (Most Recent):  Temp: 98.6 °F (37 °C) (08/11/20 1549)  Pulse: 87 (08/11/20 1549)  Resp: 17 (08/11/20 1549)  BP: (!) 146/71 (08/11/20 1549)  SpO2: 97 % (08/11/20 1549) Vital Signs (24h Range):  Temp:  [98.6 °F (37 °C)-99.8 °F (37.7 °C)] 98.6 °F (37 °C)  Pulse:  [57-95] 87  Resp:  [15-19] 17  SpO2:  [85 %-98 %] 97 %  BP: (120-172)/(59-76) 146/71     Weight: 127.7 kg (281 lb 8.4 oz)  Body mass index is 41.57 kg/m².    Intake/Output Summary (Last 24 hours) at 8/11/2020 1653  Last data filed at 8/11/2020 0405  Gross per 24 hour   Intake 50 ml   Output 500 ml   Net -450 ml      Physical Exam  Vitals signs and nursing note reviewed.   Constitutional:       General: He is not in acute distress.     Appearance: Normal appearance. He is obese. He is not ill-appearing.   HENT:      Head: Normocephalic and atraumatic.   Eyes:      General: No scleral icterus.     Pupils: Pupils are equal, round, and reactive to light.   Cardiovascular:      Rate and Rhythm: Normal rate. Rhythm irregular.      Pulses: Normal pulses.           Radial pulses are 2+ on the right side and 2+ on the left side.   Pulmonary:      Effort:  No respiratory distress.      Breath sounds: Wheezing and rales (bibasilar) present.      Comments: On CPAP  Abdominal:      General: Bowel sounds are normal. There is no distension.      Palpations: Abdomen is soft.      Tenderness: There is no guarding or rebound.      Comments: Negative Brito's sign  Negative McBurney's point    Musculoskeletal:         General: No tenderness or deformity.      Right lower leg: Edema present.      Left lower leg: Edema present.   Skin:     General: Skin is warm and dry.      Coloration: Skin is not jaundiced.      Comments: No evidence of foot ulcers or infection in intertriginous areas of bilateral feet   Neurological:      General: No focal deficit present.      Mental Status: He is alert and oriented to person, place, and time. Mental status is at baseline.      Cranial Nerves: No cranial nerve deficit.      Sensory: No sensory deficit.      Motor: No weakness.      Coordination: Coordination normal.   Psychiatric:         Mood and Affect: Mood normal.         Behavior: Behavior normal.         Significant Labs: All pertinent labs within the past 24 hours have been reviewed.    Significant Imaging: I have reviewed all pertinent imaging results/findings within the past 24 hours.

## 2020-08-11 NOTE — ASSESSMENT & PLAN NOTE
Possible etiology of TAO 2/2 ATN from vanc toxicity. Patient with supratheraputic vanc trough    - CMP daily   - avoid nephrotoxic agents   - renally dose medication when appropriate   - consider obtaining renal ultrasound   - strict I/Os   - consider nephrology consult

## 2020-08-11 NOTE — HPI
70 y/o M with HTN, Atrial Fibrillation, HLD, DM2, CKD, Gout, HFpEF who was admitted to OSH for RUL Pneumonia 3 days ago who [place] [facility] [place]-like (AMS, unresponsive) symptoms from OSH. Patient described episode of inability to respond- though aware and conscious of others around him. No control of his entire body or voice for unknown amount of time. No new medications given prior to this episode.  Imaging was negative for acute stroke and he was diagnosed with a TIA. He had resolution of all acute neurological deficits.     Patient remains on vanc and zosyn for CAP- was originally admitted to hospital for such. Was on home oxygen of 2 L's at baseline per patient, but states for his CHF (?). Denies smoking history or history of COPD. Apparently from [place] where he is a  and was in [place] for vacation when he developed fevers, chills and fatigue. States was COVID negative. Notably history positive for a fib but not previously on AC. Started on Eliquis 5 mg BID as the patient has a CHADS-Vasc score of 5 and HAS-BLED score of 2.     On 8/9/2020- patient to be stepped down from NCC team. Patient on Day 4/5 of Vanc/Zosyn for CAP (could possibly be de-escalated to typical CAP coverage though with persistent increasing leukocytosis). Patient's MRI reviewed with no evidence of acute intracranial abnormalities. Evidence of chronic microvascular changes. Increasing leukocytosis. Workup per primary with amylase, lipase, and D-dimer relatively unremarkable except D- dimer elevated at 0.93. Patient stepped down to vascular neurology on 8/10.      Pt transferred to hospital medicine primary for CAP, persistent leukocytosis, TAO.

## 2020-08-11 NOTE — SUBJECTIVE & OBJECTIVE
Past Medical History:   Diagnosis Date    CHF (congestive heart failure), NYHA class II, acute, combined     Chronic a-fib     CKD (chronic kidney disease)     Diabetes mellitus type 2, uncontrolled, with complications     Gout     HTN (hypertension), benign        History reviewed. No pertinent surgical history.    Review of patient's allergies indicates:  No Known Allergies    No current facility-administered medications on file prior to encounter.      Current Outpatient Medications on File Prior to Encounter   Medication Sig    calcipotriene (DOVONOX) 0.005 % cream Apply topically 2 (two) times daily.    clindamycin (CLEOCIN T) 1 % lotion Apply topically 2 (two) times daily.    clobetasol 0.05% (TEMOVATE) 0.05 % Oint Apply topically 2 (two) times daily.    latanoprost 0.005 % ophthalmic solution Place 1 drop into both eyes every evening.    tacrolimus (PROTOPIC) 0.1 % ointment Apply topically 2 (two) times daily.     Family History     None        Tobacco Use    Smoking status: Never Smoker    Smokeless tobacco: Never Used   Substance and Sexual Activity    Alcohol use: Not on file    Drug use: Not on file    Sexual activity: Not on file     Review of Systems   Constitutional: Negative for chills and fever.   HENT: Negative for congestion and rhinorrhea.    Respiratory: Positive for shortness of breath and wheezing.    Cardiovascular: Positive for leg swelling. Negative for chest pain and palpitations.   Gastrointestinal: Positive for abdominal pain (right side, mild 3/10) and constipation (perhaps). Negative for diarrhea, nausea and vomiting.   Musculoskeletal: Negative for arthralgias, back pain and myalgias.   Skin: Negative for rash and wound.   Neurological: Negative for dizziness, weakness and headaches.   Psychiatric/Behavioral: Positive for decreased concentration. Negative for agitation and confusion. Hallucinations:    The patient is not nervous/anxious.      Objective:     Vital Signs  (Most Recent):  Temp: 99.7 °F (37.6 °C) (08/10/20 1542)  Pulse: 74 (08/10/20 1732)  Resp: 17 (08/10/20 1732)  BP: 130/70 (08/10/20 1542)  SpO2: 96 % (08/10/20 1732) Vital Signs (24h Range):  Temp:  [97.7 °F (36.5 °C)-99.7 °F (37.6 °C)] 99.7 °F (37.6 °C)  Pulse:  [] 74  Resp:  [16-27] 17  SpO2:  [90 %-98 %] 96 %  BP: (130-163)/(70-83) 130/70     Weight: 127.7 kg (281 lb 8.4 oz)  Body mass index is 41.57 kg/m².    Physical Exam  Vitals signs and nursing note reviewed.   Constitutional:       General: He is not in acute distress.     Appearance: Normal appearance. He is obese. He is not ill-appearing.   HENT:      Head: Normocephalic and atraumatic.   Eyes:      General: No scleral icterus.     Pupils: Pupils are equal, round, and reactive to light.   Cardiovascular:      Rate and Rhythm: Normal rate. Rhythm irregular.      Pulses: Normal pulses.           Radial pulses are 2+ on the right side and 2+ on the left side.   Pulmonary:      Effort: No respiratory distress.      Breath sounds: Wheezing and rales (bibasilar) present.      Comments: Using some accessory muscles  O2 to 5 L's NC  Abdominal:      General: Bowel sounds are normal. There is no distension.      Palpations: Abdomen is soft.      Tenderness: There is no guarding or rebound.      Comments: Negative Brito's sign  Negative McBurney's point    Musculoskeletal:         General: No tenderness or deformity.      Right lower leg: Edema present.      Left lower leg: Edema present.   Skin:     General: Skin is warm and dry.      Coloration: Skin is not jaundiced.      Comments: No evidence of foot ulcers or infection in intertriginous areas of bilateral feet   Neurological:      General: No focal deficit present.      Mental Status: He is alert and oriented to person, place, and time. Mental status is at baseline.      Cranial Nerves: No cranial nerve deficit.      Sensory: No sensory deficit.      Motor: No weakness.      Coordination: Coordination  normal.   Psychiatric:         Mood and Affect: Mood normal.         Behavior: Behavior normal.         Significant Labs:   ABGs:   Recent Labs   Lab 08/10/20  1538   PH 7.432   PCO2 42.2   HCO3 28.1*   POCSATURATED 95   BE 4     CBC:   Recent Labs   Lab 08/09/20  0409 08/10/20  0604   WBC 19.45* 22.71*   HGB 10.6* 9.8*   HCT 33.5* 30.4*    191     CMP:   Recent Labs   Lab 08/09/20  0409 08/10/20  0605 08/10/20  1514    139 140   K 3.8 4.1 4.9    101 103   CO2 28 26 22*    229* 195*   BUN 62* 68* 78*   CREATININE 2.1* 2.4* 3.1*   CALCIUM 8.8 8.5* 8.7   PROT 7.1 6.5  --    ALBUMIN 2.6* 2.2*  --    BILITOT 0.7 0.9  --    ALKPHOS 95 95  --    AST 27 31  --    ALT 25 29  --    ANIONGAP 9 12 15   EGFRNONAA 31.2* 26.5* 19.5*     Cardiac Markers:   Recent Labs   Lab 08/10/20  1325   *     Urine Studies:   Recent Labs   Lab 08/10/20  1228   COLORU Yellow   APPEARANCEUA Cloudy*   PHUR 5.0   SPECGRAV 1.020   PROTEINUA 2+*   GLUCUA Negative   KETONESU Negative   BILIRUBINUA Negative   OCCULTUA 3+*   NITRITE Negative   LEUKOCYTESUR Negative   RBCUA >100*   WBCUA 7*   BACTERIA Many*   SQUAMEPITHEL 1   HYALINECASTS 2*     All pertinent labs within the past 24 hours have been reviewed.    Significant Imaging: I have reviewed all pertinent imaging results/findings within the past 24 hours.

## 2020-08-11 NOTE — PT/OT/SLP PROGRESS
Physical Therapy      Patient Name:  Amadeo Stevenson   MRN:  [id number]    Patient not seen today secondary to Other (Comment)(nursing hold). Will follow-up 8/12/20.    Zohra Crane PTA

## 2020-08-11 NOTE — PROGRESS NOTES
[facility] Medicine  Progress Note    Patient Name: Amadeo Stevenson  MRN: [id number]  Patient Class: IP- Inpatient   Admission Date: 8/7/2020  Length of Stay: 4 days  Attending Physician: Jill Ortiz MD  Primary Care Provider: To Obtain Unable        Subjective:     Principal Problem:Acute hypoxemic respiratory failure        HPI:  68 y/o M with HTN, Atrial Fibrillation, HLD, DM2, CKD, Gout, HFpEF who was admitted to OSH for RUL Pneumonia 3 days ago who presented to [facility] with stroke-like (AMS, unresponsive) symptoms from OSH. Patient described episode of inability to respond- though aware and conscious of others around him. No control of his entire body or voice for unknown amount of time. No new medications given prior to this episode.  Imaging was negative for acute stroke and he was diagnosed with a TIA. He had resolution of all acute neurological deficits.     Patient remains on vanc and zosyn for CAP- was originally admitted to hospital for such. Was on home oxygen of 2 L's at baseline per patient, but states for his CHF (?). Denies smoking history or history of COPD. Apparently from [place] where he is a  and was in [place] for vacation when he developed fevers, chills and fatigue. States was COVID negative. Notably history positive for a fib but not previously on AC. Started on Eliquis 5 mg BID as the patient has a CHADS-Vasc score of 5 and HAS-BLED score of 2.     On 8/9/2020- patient to be stepped down from NCC team. Patient on Day 4/5 of Vanc/Zosyn for CAP (could possibly be de-escalated to typical CAP coverage though with persistent increasing leukocytosis). Patient's MRI reviewed with no evidence of acute intracranial abnormalities. Evidence of chronic microvascular changes. Increasing leukocytosis. Workup per primary with amylase, lipase, and D-dimer relatively unremarkable except D- dimer elevated at 0.93. Patient stepped down to vascular neurology on 8/10.      Pt  transferred to hospital medicine primary for CAP, persistent leukocytosis, TAO.    Overview/Hospital Course:  CXR 8/10 c/f volume overload. Pt given lasix 80 IV w some response; ramos placed. Urine studies ordered. VSS stable on CPAP 10/35%.     Interval History: Pt stable on CPAP 10/35%. NAEON. VSS.     Review of Systems   Constitutional: Negative for chills and fever.   HENT: Negative for congestion and rhinorrhea.    Respiratory: Positive for shortness of breath and wheezing.    Cardiovascular: Positive for leg swelling. Negative for chest pain and palpitations.   Gastrointestinal: Positive for abdominal pain (right side, mild 3/10) and constipation (no bm x5d). Negative for diarrhea, nausea and vomiting.   Musculoskeletal: Negative for arthralgias, back pain and myalgias.   Skin: Negative for rash and wound.   Neurological: Negative for dizziness, weakness and headaches.   Psychiatric/Behavioral: Positive for decreased concentration. Negative for agitation and confusion. Hallucinations:    The patient is not nervous/anxious.      Objective:     Vital Signs (Most Recent):  Temp: 98.6 °F (37 °C) (08/11/20 1549)  Pulse: 87 (08/11/20 1549)  Resp: 17 (08/11/20 1549)  BP: (!) 146/71 (08/11/20 1549)  SpO2: 97 % (08/11/20 1549) Vital Signs (24h Range):  Temp:  [98.6 °F (37 °C)-99.8 °F (37.7 °C)] 98.6 °F (37 °C)  Pulse:  [57-95] 87  Resp:  [15-19] 17  SpO2:  [85 %-98 %] 97 %  BP: (120-172)/(59-76) 146/71     Weight: 127.7 kg (281 lb 8.4 oz)  Body mass index is 41.57 kg/m².    Intake/Output Summary (Last 24 hours) at 8/11/2020 1653  Last data filed at 8/11/2020 0405  Gross per 24 hour   Intake 50 ml   Output 500 ml   Net -450 ml      Physical Exam  Vitals signs and nursing note reviewed.   Constitutional:       General: He is not in acute distress.     Appearance: Normal appearance. He is obese. He is not ill-appearing.   HENT:      Head: Normocephalic and atraumatic.   Eyes:      General: No scleral icterus.     Pupils:  Pupils are equal, round, and reactive to light.   Cardiovascular:      Rate and Rhythm: Normal rate. Rhythm irregular.      Pulses: Normal pulses.           Radial pulses are 2+ on the right side and 2+ on the left side.   Pulmonary:      Effort: No respiratory distress.      Breath sounds: Wheezing and rales (bibasilar) present.      Comments: On CPAP  Abdominal:      General: Bowel sounds are normal. There is no distension.      Palpations: Abdomen is soft.      Tenderness: There is no guarding or rebound.      Comments: Negative Brito's sign  Negative McBurney's point    Musculoskeletal:         General: No tenderness or deformity.      Right lower leg: Edema present.      Left lower leg: Edema present.   Skin:     General: Skin is warm and dry.      Coloration: Skin is not jaundiced.      Comments: No evidence of foot ulcers or infection in intertriginous areas of bilateral feet   Neurological:      General: No focal deficit present.      Mental Status: He is alert and oriented to person, place, and time. Mental status is at baseline.      Cranial Nerves: No cranial nerve deficit.      Sensory: No sensory deficit.      Motor: No weakness.      Coordination: Coordination normal.   Psychiatric:         Mood and Affect: Mood normal.         Behavior: Behavior normal.         Significant Labs: All pertinent labs within the past 24 hours have been reviewed.    Significant Imaging: I have reviewed all pertinent imaging results/findings within the past 24 hours.      Assessment/Plan:      * Acute hypoxemic respiratory failure  Patient w/ HFE & RUL PNA on Vanc and Zosyn requiring 3L NC. Patient has been tachypneic on 8/10/20. Patient noted to be disconnected from supplemental O2 w/ O2 Sats 80% which was responsive to 3L NC and returning to 95%. At the time of my exam patient using accessory muscles and abdomen to breath. Patient appeared uncomfortable. He had wheezes and rales. Patient with worsening leukocytosis and  BNP. ABG w/ pH of 7.4, pCO2 of 42.2, and pO2 of 74 on 3L NC. Concerned for atypical PNA vs HF exacerbation vs PE. Pt grossly volume overloaded on exam 8/11.     - lasix 80 IV w some UOP response; plan for ramos placement and further diuresis  - plan to wean to NC/CPAP at night  - continue vanc/zosyn/azithro  - f/u TB skin test   - f/u legionella urine antigen   - consider CT chest w/out contrast if patient fails to improve   - low threshold to obtain V/Q scan to evaluate for PE   - strict I/Os   - daily CMP   - scheduled duo-nebs w/ PRN orders     Leukocytosis        TAO on CKD III  Possible etiology of TAO 2/2 ATN from vanc toxicity/sepsis vs cardiorenal vs prerenal. Patient with supratheraputic vanc trough at 22 on 8/10. Vanc trough since normalized.     - CMP daily   - avoid nephrotoxic agents   - renally dose medication when appropriate   - consider obtaining renal ultrasound   - strict I/Os   - consider nephrology consult   - f/u urine pr:cr ratio, urea  - f/u kidney u/s    Chronic diastolic congestive heart failure  See Acute Hypoxemic Respiratory Failure     Constipation  No BM for 5d per pt. R-sided abd pain on exam.    - bisacodyl, miralax, senna  - f/u KUB      Chronic atrial fibrillation  - continue with amiodarone, coreg, and apixaban     Type 2 diabetes mellitus  - continue w/ basal/bolus insulin w/ SSI   - titrate to BG goal 140 - 180   - POCT BG AC/HS       Essential hypertension  - continue Coreg 25 mg BID and       TIA (transient ischemic attack)  Pt without lingering focal neuro deficits. Initial stroke workup wnl.    - ctm neuro status    Community acquired bacterial pneumonia  See Acute Hypoxemic Respiratory Failure       VTE Risk Mitigation (From admission, onward)         Ordered     apixaban tablet 5 mg  2 times daily      08/09/20 1945     IP VTE LOW RISK PATIENT  Once      08/08/20 0007     Place sequential compression device  Until discontinued      08/08/20 0007     Reason for No  Pharmacological VTE Prophylaxis  Once     Question:  Reasons:  Answer:  Risk of Bleeding    08/08/20 0007                      Hema Renee MD  Department of Hospital Medicine   [facility]                      08/11/2020                             STAFF PHYSICIAN NOTE                                   Attending Attestation for Rounds with Resident  I have reviewed and concur with the resident's history, physical, assessment, and plan.  I have personally interviewed and examined the patient at bedside and agree with the resident's findings.             70 y/o M with hx of HTN, Atrial Fibrillation, HLD, DM2, CKD, Gout, HFpEF who was admitted to OSH for RUL Pneumonia.  On amiodorone. Lasix 80 iv given.  Cxr - interstitial markings. RUQ pain, appears volume overloaded, treating Constipation.  Consider high resolution CT and Pulmonary consult if not improving.                           Jill Ortiz MD  Senior Hospitalist  [id number], [phone]

## 2020-08-12 NOTE — ASSESSMENT & PLAN NOTE
Patient w/ HFE & RUL PNA on Vanc and Zosyn requiring 3L NC. Patient has been tachypneic on 8/10/20. Patient noted to be disconnected from supplemental O2 w/ O2 Sats 80% which was responsive to 3L NC and returning to 95%. At the time of my exam patient using accessory muscles and abdomen to breath. Patient appeared uncomfortable. He had wheezes and rales. Patient with worsening leukocytosis and BNP. ABG w/ pH of 7.4, pCO2 of 42.2, and pO2 of 74 on 3L NC. Concerned for atypical PNA vs HF exacerbation vs PE. Pt grossly volume overloaded on exam 8/11.     - lasix 80 IV w some UOP response; plan for ramos placement and further diuresis  - plan to wean to NC/CPAP at night  - continue vanc/zosyn/azithro  - f/u TB skin test   - f/u legionella urine antigen   - consider CT chest w/out contrast if patient fails to improve   - strict I/Os   - daily CMP   - scheduled duo-nebs w/ PRN orders

## 2020-08-12 NOTE — PLAN OF CARE
Plan of Care:  TF to ICU s/p cardiac arrest. Please re-order therapy when medically appropriate for progressive mobility.    Amberly Mathew PT, DPT  2020  Pager: [phone]    Physical Therapy Treatment Goals:  Multidisciplinary Problems       Physical Therapy Goals       Not on file              Multidisciplinary Problems (Resolved)          Problem: Physical Therapy Goal    Goal Priority Disciplines Outcome Goal Variances Interventions   Physical Therapy Goal   (Resolved)     PT, PT/OT Met     Description: Goals to be met by:      Patient will increase functional independence with mobility by performin. Supine to sit with Stand-by Assistance  2. Sit to supine with Stand-by Assistance  3. Sit to stand transfer with Stand-by Assistance  4. Gait  x 100 feet with Supervision using Single-point Cane .   5. Lower extremity exercise program x15 reps per handout, with independence to improve strength and activity tolerance.   6. Ambulate 2 minutes with stand by assistance with no AD while performing dynamic head turns, sudden change in speed and direction, withstand dynamic perturbations with no loss of balance.

## 2020-08-12 NOTE — PLAN OF CARE
Patient coded and .   20 1045   Final Note   Assessment Type Final Discharge Note   Anticipated Discharge Disposition    Hospital Follow Up  Appt(s) scheduled? No   Discharge plans and expectations educations in teach back method with documentation complete? No   Right Care Referral Info   Post Acute Recommendation No Care   Post-Acute Status   Discharge Delays None known at this time

## 2020-08-12 NOTE — CODE/ RAPID DOCUMENTATION
0828 Pt found unresponsive, no pulse, no respirations  Chest Compressions Started Immediately  Chest pads and Back Board Placed.  0832 Code Team Arrives

## 2020-08-12 NOTE — CARE UPDATE
Rapid Response Nurse Chart Check     Chart check completed, abnormal VS noted. bedside RN, Yoselin contacted. No concerns verbalized at this time. Instructed to call [phone] for further concerns or assistance.

## 2020-08-12 NOTE — ASSESSMENT & PLAN NOTE
Possible etiology of TAO 2/2 ATN from vanc toxicity/sepsis vs cardiorenal vs prerenal. Patient with supratheraputic vanc trough at 22 on 8/10. Vanc trough since normalized. US RP with MRD.    - CMP daily   - avoid nephrotoxic agents   - renally dose medication when appropriate   - consider obtaining renal ultrasound   - strict I/Os   - consider nephrology consult

## 2020-08-12 NOTE — RESPIRATORY THERAPY
Rapid Response Respiratory Therapy Code Blue Note      Code Status: Full Code   : 1950  Bed: 950/950 A:   MRN: [id number]  Time page Received: 932  Time Rapid Response RT at Bedside: 933  Time Rapid Response RT left Bedside: 09  Report given to: Pt     SITUATION     Evaluated patient for: code blue    BACKGROUND     Patient has a past medical history of CHF (congestive heart failure), NYHA class II, acute, combined, Chronic a-fib, CKD (chronic kidney disease), Diabetes mellitus type 2, uncontrolled, with complications, Gout, and HTN (hypertension), benign.    ASSESSMENT     Was the patient on NIPPV prior to code?: Yes, Type: cpap Settings: at night  Does the patient have a surgical airway: No  Was the patient intubated? yes   Tube Size: 8   Secured at: 26cm at lip  ETCO2 monitored:    Ambu at bedside: Ambu bag with the patient?: Yes, Adult Ambu    INTERVENTIONS/RECOMMENDATIONS     pt     Please see Code Blue Documentation for more details.    OUTCOME     Patient .    Disposition: Remain in room 950.    FOLLOW-UP     Please call back the Rapid Response RTAnahi, RT at x [phone] for any questions or concerns.

## 2020-08-12 NOTE — PT/OT/SLP DISCHARGE
Physical Therapy Discharge Summary    Name: Amadeo Stevenson  MRN: [id number]   Principal Problem: Acute hypoxemic respiratory failure     Patient Discharged from acute Physical Therapy on 20.  Please refer to prior PT noted date on 20 for functional status.     Assessment:     Patient transferred to lower level of care secondary to cardiac arrest    Objective:     GOALS:   Multidisciplinary Problems     Physical Therapy Goals        Problem: Physical Therapy Goal    Goal Priority Disciplines Outcome Goal Variances Interventions   Physical Therapy Goal     PT, PT/OT Ongoing, Progressing     Description: Goals to be met by:      Patient will increase functional independence with mobility by performin. Supine to sit with Stand-by Assistance  2. Sit to supine with Stand-by Assistance  3. Sit to stand transfer with Stand-by Assistance  4. Gait  x 100 feet with Supervision using Single-point Cane .   5. Lower extremity exercise program x15 reps per handout, with independence to improve strength and activity tolerance.   6. Ambulate 2 minutes with stand by assistance with no AD while performing dynamic head turns, sudden change in speed and direction, withstand dynamic perturbations with no loss of balance.                          Reasons for Discontinuation of Therapy Services  Transfer to alternate level of care.      Plan:     Please re-order therapy when medically appropriate for progressive mobility.    Amberly Mathew, PT  2020

## 2020-08-12 NOTE — CHAPLAIN
present for entire Code Blue and subsequent death; family had not arrived at [facility]; paperwork with charge nurse; pt is ;  Ishaan is now on duty and can be reached via [phone] and will meet with family, ask if they'd like a Red, White & Blue and get the completed paperwork. , in your mercy.

## 2020-08-12 NOTE — CODE DOCUMENTATION
"  Code Blue Rapid Response Nurse Note     Admit Date: 2020  LOS: 5  Code Status: Full Code   Date of Consult: 2020  : 1950  Age: 69 y.o.  Weight:   Wt Readings from Last 1 Encounters:   08/10/20 127.7 kg (281 lb 8.4 oz)     Sex: male  Race: Black or    Bed: 950/950 A:   MRN: [id number]  Time Rapid Response Team page Received: 0832  Time Rapid Response Team at Bedside: 0835  Time Rapid Response Team left Bedside: 0855  Was the patient discharged from an ICU this admission?   yes  Was the patient discharged from a PACU within last 24 hours?  no  Did the patient receive conscious sedation/general anesthesia within last 24 hours?  no  Was the patient in the ED within the past 24 hours?  no  Was the patient started on NIPPV within the past 24 hours?  no  Did this progress into an ARC or CPA: Cardio Pulmonary Arrest  Attending Physician: Jill Ortiz MD  Primary Service: [facility]  Consult Requested By: Jill Ortiz MD     SITUATION     Notified by telemetry via phone call.  Reason for alert: Patient noted to be bradycardic in the 30s per telemetry at 0829. Charge nurse notified immediately. RN stated "I am in a code". RRN informed to call Code Blue, RR team headed to 950.     Called to evaluate the patient for Circulatory    BACKGROUND     Why is the patient in the hospital?: Acute hypoxemic respiratory failure   has a past medical history of CHF (congestive heart failure), NYHA class II, acute, combined, Chronic a-fib, CKD (chronic kidney disease), Diabetes mellitus type 2, uncontrolled, with complications, Gout, and HTN (hypertension), benign.    ASSESSMENT     What did you find: On arrival, chest compressions being performed.      Time of CPR initiation: 828  Time of first Epinephrine dose: 833  ETCO2 utilized to guide CPR: No    Please see Code Blue Documentation for more details.    OUTCOME     Patient . Time of death: 850 Pronounced by: MD Khang with " CC    Disposition: Remain in room 950. Awaiting family arrival to determine next steps in transport    CODE TEAM MEMBERS     : Dr. Quiñonez    Resident/JAMES: Inez Wick MD    RRN: Anjelica Barrios, Sharon Dickinson, Chani Roa    Bedside RN: Yoselin Wilson RN: Otto    RRT: Anahi Siddiqui    Additional staff: VIRGINIE Varela RN; Myron Colón, Anesthesia; Ilsa, Pharmacy; MD Angel; MD Janel

## 2020-08-12 NOTE — PT/OT/SLP PROGRESS
Speech Language Pathology      Amadeo Stevenson  MRN: [id number]    Patient not seen today secondary to pt  after code blue.     Katelyn Lopez, RAYNA-SLP

## 2020-08-12 NOTE — PROGRESS NOTES
Pt remains lethargic throughout shift. Pt has NC on 2L SpO2 91 at rest. Pt returns from US and pivots back to bed and becomes SOB. Pt placed on CPAP to control breathing. Pt unable to full BM, pt only small amounts of watery liquid. Pt ABD is distended and tender to touch. Pt is compliant with all medications however unable to have BM.

## 2020-08-12 NOTE — DISCHARGE SUMMARY
[facility] Medicine  Discharge Summary      Patient Name: Amadeo Stevenson  MRN: [id number]  Admission Date: 8/7/2020  Hospital Length of Stay: 5 days  Discharge Date and Time:  08/12/2020 6:01 PM  Attending Physician: No att. providers found   Discharging Provider: Ayde Salcedo MD  Primary Care Provider: To Obtain Unable      HPI:   70 y/o M with HTN, Atrial Fibrillation, HLD, DM2, CKD, Gout, HFpEF who was admitted to OSH for RUL Pneumonia 3 days ago who presented to [facility] with stroke-like (AMS, unresponsive) symptoms from OSH. Patient described episode of inability to respond- though aware and conscious of others around him. No control of his entire body or voice for unknown amount of time. No new medications given prior to this episode.  Imaging was negative for acute stroke and he was diagnosed with a TIA. He had resolution of all acute neurological deficits.     Patient remains on vanc and zosyn for CAP- was originally admitted to hospital for such. Was on home oxygen of 2 L's at baseline per patient, but states for his CHF (?). Denies smoking history or history of COPD. Apparently from [place] where he is a  and was in [place] for vacation when he developed fevers, chills and fatigue. States was COVID negative. Notably history positive for a fib but not previously on AC. Started on Eliquis 5 mg BID as the patient has a CHADS-Vasc score of 5 and HAS-BLED score of 2.     On 8/9/2020- patient to be stepped down from NCC team. Patient on Day 4/5 of Vanc/Zosyn for CAP (could possibly be de-escalated to typical CAP coverage though with persistent increasing leukocytosis). Patient's MRI reviewed with no evidence of acute intracranial abnormalities. Evidence of chronic microvascular changes. Increasing leukocytosis. Workup per primary with amylase, lipase, and D-dimer relatively unremarkable except D- dimer elevated at 0.93. Patient stepped down to vascular neurology on 8/10.      Pt  transferred to hospital medicine primary for CAP, persistent leukocytosis, TAO.    * No surgery found *      Hospital Course:   Patient initially admitted to OSH for RUL PNA and was transferred for NCC on 8/8 due to unresponsiveness. CT head, CTA head from OSH negative, MRI and EEG negative here. Symptoms resolved spontaneously and patient treated for TIA. Patient stepped down to vascular neurology 8/9 when he was stable. Was treated with vanc/zosyn for broad coverage of CAP, CXR 8/10 c/f volume overload, labs with TAO and rising SCr. Transferred to hospital medicine for further management of respiratory failure and TAO on 8/11. Pt was given lasix 80 IV x2 w some response, but not massively. Urine studies ordered. Patient placed on intermittent CPAP for assistance with suspected pulmonary edema on CXR. Over the course of the day, patient was somnolent but had periods in which he woke up and was able to answer questions appropriately. He was able to stand up to urinate in a urinal. Blood gases throughout the day unremarkable. Overnight, patient developed some tachypnea and placed back on CPAP. Morning of 8/12 at 8:28am, patient was found unresponsive and code blue was called. Multiple rounds of chest compressions with epi. Patient pronounced dead at 8:51am. Patient's wife was notified during code, events of patient's hospitalization discussed with her on arrival.  available for support throughout.     Consults:   Consults (From admission, onward)        Status Ordering Provider     Inpatient consult to [facility] Medicine - Stroke Comanagement  Once     Provider:  (Not yet assigned)    Completed ROBIN SANDERSON     Inpatient consult to Physical Medicine Rehab  Once     Provider:  (Not yet assigned)    Completed MEGHAN PEGUERO     Inpatient consult to Registered Dietitian/Nutritionist  Once     Provider:  (Not yet assigned)    Completed MEGHAN PEGUERO     Inpatient consult to Social Work/Case Management   Once     Provider:  (Not yet assigned)    Acknowledged LUIS DAVIS     Inpatient consult to Vascular (Stroke) Neurology  Once     Provider:  (Not yet assigned)    Completed MEGHAN PEGUERO     Pharmacy to dose Vancomycin consult  Once     Provider:  (Not yet assigned)    Acknowledged LUIS DAVIS          * Acute hypoxemic respiratory failure  Patient w/ HFE & RUL PNA on Vanc and Zosyn requiring 3L NC. Patient has been tachypneic on 8/10/20. Patient noted to be disconnected from supplemental O2 w/ O2 Sats 80% which was responsive to 3L NC and returning to 95%. At the time of my exam patient using accessory muscles and abdomen to breath. Patient appeared uncomfortable. He had wheezes and rales. Patient with worsening leukocytosis and BNP. ABG w/ pH of 7.4, pCO2 of 42.2, and pO2 of 74 on 3L NC. Concerned for atypical PNA vs HF exacerbation vs PE. Pt grossly volume overloaded on exam 8/11.     - lasix 80 IV w some UOP response; plan for ramos placement and further diuresis  - plan to wean to NC/CPAP at night  - continue vanc/zosyn/azithro  - f/u TB skin test   - f/u legionella urine antigen   - consider CT chest w/out contrast if patient fails to improve   - strict I/Os   - daily CMP   - scheduled duo-nebs w/ PRN orders     Leukocytosis        TAO on CKD III  Possible etiology of TAO 2/2 ATN from vanc toxicity/sepsis vs cardiorenal vs prerenal. Patient with supratheraputic vanc trough at 22 on 8/10. Vanc trough since normalized. US RP with MRD.    - CMP daily   - avoid nephrotoxic agents   - renally dose medication when appropriate   - consider obtaining renal ultrasound   - strict I/Os   - consider nephrology consult     Chronic diastolic congestive heart failure  See Acute Hypoxemic Respiratory Failure     Constipation  No BM for 5d per pt. R-sided abd pain on exam.    - bisacodyl, miralax, senna      Chronic atrial fibrillation  - continue with amiodarone, coreg, and apixaban     Type 2 diabetes mellitus  -  continue w/ basal/bolus insulin w/ SSI   - titrate to BG goal 140 - 180   - POCT BG AC/HS       Essential hypertension  - continue Coreg 25 mg BID      TIA (transient ischemic attack)  Pt without lingering focal neuro deficits. Initial stroke workup wnl.    - ctm neuro status    Community acquired bacterial pneumonia  See Acute Hypoxemic Respiratory Failure       Final Active Diagnoses:    Diagnosis Date Noted POA    PRINCIPAL PROBLEM:  Acute hypoxemic respiratory failure [J96.01] 08/10/2020 Yes    Constipation [K59.00] 08/10/2020 Yes    Chronic diastolic congestive heart failure [I50.32] 08/10/2020 Yes    TAO on CKD III [N17.9] 08/10/2020 Yes    Leukocytosis [D72.829] 08/10/2020 Yes    Altered mental state [R41.82]  Yes    Community acquired bacterial pneumonia [J15.9] 2020 Yes    TIA (transient ischemic attack) [G45.9] 2020 Yes    Essential hypertension [I10] 2020 Yes    Type 2 diabetes mellitus [E11.9] 2020 Yes    Chronic atrial fibrillation [I48.20] 2020 Yes      Problems Resolved During this Admission:    Diagnosis Date Noted Date Resolved POA    Unresponsive [R41.89] 2020 08/10/2020 Yes    Unresponsive [R41.89] 2020 Yes       Discharged Condition:     Disposition:     Follow Up:    Patient Instructions:   No discharge procedures on file.    Significant Diagnostic Studies: Labs:   CMP   Recent Labs   Lab 200 20  0428    137   K 4.2 4.6    99   CO2 26 25   * 102   BUN 82* 95*   CREATININE 3.1* 3.4*   CALCIUM 8.2* 7.9*   PROT 6.4 6.6   ALBUMIN 2.0* 1.9*   BILITOT 0.9 1.0   ALKPHOS 111 201*   AST 77* 312*   ALT 54* 181*   ANIONGAP 11 13   ESTGFRAFRICA 22.5* 20.1*   EGFRNONAA 19.5* 17.4*    and CBC   Recent Labs   Lab 200 20  0428   WBC 19.80* 14.43*   HGB 9.1* 9.7*   HCT 28.4* 30.5*    187       Pending Diagnostic Studies:     Procedure Component Value Units Date/Time    APTT  [[id number]] Collected: 20 1038    Order Status: Sent Lab Status: In process Updated: 20 1039    Specimen: Blood     Legionella antigen, urine [[id number]] Collected: 08/10/20 2041    Order Status: Sent Lab Status: In process Updated: 08/10/20 2048    Specimen: Urine, Clean Catch     Phosphorus [[id number]] Collected: 20 2345    Order Status: Sent Lab Status: No result     Specimen: Blood     Vancomycin, random [[id number]] Collected: 20 0510    Order Status: Sent Lab Status: In process Updated: 20 0514    Specimen: Blood          Medications:  None (patient  at medical facility)    Indwelling Lines/Drains at time of discharge:   Lines/Drains/Airways     Airway                 Airway - Non-Surgical 20 0843 Endotracheal Tube less than 1 day       Airway Anesthesia 20 less than 1 day                Time spent on the discharge of patient: 55 minutes  Patient was seen and examined on the date of discharge and determined to be suitable for discharge.         Ayde Salcedo MD  Department of Hospital Medicine  [facility]

## 2020-08-12 NOTE — ANESTHESIA PROCEDURE NOTES
Ad Hoc Intubation  Performed by: Meliton Colón MD  Authorized by: Meliton Colón MD     Indications:  Respiratory failure  Diagnosis:  Cardiac arrest   Patient Location:  Floor  Timeout:  8/12/2020 8:43 AM  Procedure Start Time:  8/12/2020 8:43 AM  Procedure End Time:  8/12/2020 8:43 AM  Staff:     Anesthesiologist Present: No    Intubation:     Mask Ventilation:  Easy with oral airway    Attempts:  1    Attempted By:  Resident anesthesiologist    Method of Intubation:  Video laryngoscopy    Blade:  Blankenship 3    Laryngeal View Grade: Grade I - full view of chords      Difficult Airway Encountered?: No      Airway Device Size:  8.0    Style/Cuff Inflation:  Cuffed    Tube secured:  25    Secured at:  The lips    Placement Verified By:  Capnometry and Revisualization with laryngoscopy    Complicating Factors:  None  Notes:      Paged for code blue, CPR in progress.

## 2020-08-13 LAB
BACTERIA BLD CULT: NORMAL
BACTERIA BLD CULT: NORMAL
L PNEUMO AG UR QL IA: NEGATIVE

## 2020-08-13 NOTE — PHYSICIAN QUERY
"PT Name: Amadeo Stevenson  MR #: [id number]     Diagnosis Clarification      CDS/: Irma Aparicio RN, CDS               Contact information: [email or web address]    This form is a permanent document in the medical record.     Query Date: August 13, 2020    Dear Provider,  By submitting this query, we are merely seeking further clarification of documentation.  Please utilize your independent clinical judgment when addressing the question(s) below.     The medical record contains the following:    Supporting Clinical Information Location in Medical Record     --admitted to OSH for RUL Pneumonia 3 days ago.   --Today patient had episode of AMS, unresponsiveness  --Community acquired bacterial pneumonia  IV Vancomycin / Zosyn at OSH  Will continue therapy at this time  Albuterol Nebulizers q4 hours         --Interval History:  NAEON. MRI and EEG negative for any acute intracranial process. Pt with leukocytosis, no fever. Ordered amylase, lipase, and d-dimer to look for non-infectious reason for elevated wbc    --Community acquired bacterial pneumonia         -Vanc/Zosyn Day 5         -Consult to HM for increasing leukocytosis      - Given history of AMS and PNA- may be worth while to check for Legionella; no Hyponatremia or Diarrhea in history though  --Leukocytosis        - Trend 11 -> 17 -> 22         - Persistently rising (no history of steroids)         - Left shift noted                 --Community acquired bacterial pneumonia  --Acute hypoxemic respiratory failure  --Hospital Medicine was consulted for "CAP- leukocytosis worsening, no diarrhea, no focal exam findings, on vanc/zosyn- day 5 today"  --Patient with worsening leukocytosis and BNP. ABG w/ pH of 7.4, pCO2 of 42.2, and pO2 of 74 on 3L NC. Concerned for atypical PNA vs HF exacerbation vs PE.   - started on Azithro 500 mg x 3 days   - discontinued Vanc   - continue Zosyn   - obtain TB skin test   - obtain legionella urine antigen   - consider CT " chest w/out contrast if patient fails to improve     --Possible etiology of TAO 2/2 ATN from vanc toxicity/sepsis vs cardiorenal vs prerenal    WBC 11.76 11.76 19.45  22.71  19.80 14.43    Blood Culture, Routine No growth after 5 days  Respiratory Culture Normal respiratory michaela     Temp: 97.8... 98.2... 97.8... 99.8... 97.9... 97.9 .       NCC H&P 8/8              NCC Note 8/9        Vas neuro Note 8/10                    Hosp Med c/s 8/10                          Hosp Med c/s 8/10      Labs 8/9->8/12  Lab 8/8      VS Flowsheet 8/7->8/12     Please clarify if the __sepsis__ diagnosis and the Present on Admit status (if applicable)    [  ] Ruled In   [ x] Ruled Out   [  ] Other/Clarification of findings (please specify)_______________    [   ] Clinically undetermined       Present on admission (POA) status:   [   ] Yes (Y)                          [  ] Clinically Undetermined (W)  [  x ] No (N)                            [   ] Documentation insufficient to determine if condition is POA (U)       Please document in your progress notes daily for the duration of treatment, until resolved, and include in your discharge summary.

## 2020-08-13 NOTE — PHYSICIAN QUERY
"PT Name: Amadeo Stevenson  MR #: [id number]     ACUITY OF CONDITION CLARIFICATION      CDS/: Irma Aparicio RN, CDS              Contact information: [email or web address]  This form is a permanent document in the medical record.     Query Date: August 13, 2020    By submitting this query, we are merely seeking further clarification of documentation to reflect the severity of illness of your patient. Please utilize your independent clinical judgment when addressing the question(s) below.    The Medical record reflects the following:     Indicators   Supporting Clinical Findings Location in Medical Record   x Documentation of condition --Chronic diastolic congestive heart failure          - Echo with EF of 50-55% on this admission, Grade III DD  - Apparently takes Lasix 20 mg po QD   - Was not on Lasix in the NCC   - Exam with LE edema, bibasilar rales today       - States on home O2- 2-3 L's, no history of COPD     --Concerned for atypical PNA vs HF exacerbation vs PE.   --Chronic diastolic congestive heart failure      -See Acute Hypoxemic Respiratory Failure     [facility]As Neuro Note 8/10                Hosp med c/s 8/10   x Lab Value(s) BNP: 440->691   Labs 8/8->8/10   x Radiology Findings -- CXR repeated today- "dense opacification of the periphery of the anterior and/or posterior segment of the right upper lobe with air bronchograms apparent more centrally." Additionally, "Since that time the patient has developed widespread patchy heterogeneous subsegmental opacities concerning for pulmonary edema, aspiration or multifocal pneumonia."    VAs Neuro Note 8/10   x Treatment/Medication - Lasix 20 mg IV x 1 ordered today given interval CXR worsening   VAs Neuro Note 8/10   x Other --TAO on CKD III    --Acute hypoxemic respiratory failure          -Breath sounds: Wheezing and rales (bibasilar) present.           -Comments: Using some accessory muscles  O2 to 5 L's NC          -Patient w/ HFE & RUL PNA            " -Concerned for atypical PNA vs HF exacerbation vs PE.     --Was on home oxygen of 2 L's at baseline per patient  --CXR 8/10 c/f volume overload  --Pt was given lasix 80 IV x2 w some response, but not massively  --Overnight, patient developed some tachypnea and placed back on CPAP. Morning of 8/12 at 8:28am, patient was found unresponsive and code blue was called   VAs Neuro Note 8/10    Hosp med c/s 8/10              Discharge Summary      Provider, please specify the acuity/chronicity of __diastolic congestive heart failure__ :    [   ] Chronic   [ x  ] Acute on chronic   [   ]  Clinically Undetermined         Present on admission (POA) status:   [   ] Yes (Y)                           [   ] Clinically Undetermined (W)  [   ] No (N)                            [   ] Documentation insufficient to determine if condition is POA (U)       Please document in your progress notes daily for the duration of treatment until resolved, and include in your discharge summary.

## 2021-12-29 NOTE — CONSULTS
"  [facility]  Adult Nutrition  Consult Note    SUMMARY     Recommendations    Recommendation:   1. Continue 2000 kcal DM/cardiac diet, encourage good PO intake   2. If PO declines <50% of meals, add boost glucose daily to increase intake   RD to monitor and follow up    Goals: pt to tolerate >85% of EEN/EPN by RD follow up  Nutrition Goal Status: new  Communication of RD Recs: other (comment)(POC)    Reason for Assessment    Reason For Assessment: consult  Diagnosis: (community aquired bacterial pneumonia)  Relevant Medical History: HTN; Afib; DM; CHF; CKD  Interdisciplinary Rounds: did not attend  General Information Comments: Pt sitting in room, awaiting bfst tray. Reported good PO intake at home PTA, no use of ONS at home. States no recent wt loss, UBW ~260 lbs per pt. No c/o N/V/C/D at this time. Pt with no indcations of malnutrition, NFPE not warranted. RD to monitor.  Nutrition Discharge Planning: adequate PO intake    Nutrition Risk Screen    Nutrition Risk Screen: no indicators present    Nutrition/Diet History    Food Allergies: NKFA  Factors Affecting Nutritional Intake: None identified at this time    Anthropometrics    Temp: 98 °F (36.7 °C)  Height Method: Stated  Height: 5' 9" (175.3 cm)  Height (inches): 69 in  Weight Method: Bed Scale  Weight: 121.8 kg (268 lb 8.3 oz)  Weight (lb): 268.52 lb  Ideal Body Weight (IBW), Male: 160 lb  % Ideal Body Weight, Male (lb): 167.83 %  BMI (Calculated): 39.6  BMI Grade: 35 - 39.9 - obesity - grade II       Lab/Procedures/Meds    Pertinent Labs Reviewed: reviewed  Pertinent Labs Comments: BUN 58; Glucose 337  Pertinent Medications Reviewed: reviewed  Pertinent Medications Comments: senna-docusate; statin; insulin; famotidine     Estimated/Assessed Needs    Weight Used For Calorie Calculations: 121.8 kg (268 lb 8.3 oz)  Energy Calorie Requirements (kcal): 1973 kcal/d  Energy Need Method: Kirill Starks  Protein Requirements:  " g/day(0.8-1.0)  Weight Used For Protein Calculations: 121.8 kg (268 lb 8.3 oz)  Fluid Requirements (mL): 1 mL/kcal or per MD  RDA Method (mL): 1973  CHO Requirement: 246    Nutrition Prescription Ordered    Current Diet Order: 2000 kcal DM/cardiac diet    Evaluation of Received Nutrient/Fluid Intake    I/O: +.1 L since admit  Energy Calories Required: meeting needs  Protein Required: meeting needs  Fluid Required: meeting needs  Comments: LBM 8/7  Tolerance: tolerating  % Intake of Estimated Energy Needs: 75 - 100 %  % Meal Intake: 75 - 100 %    Nutrition Risk    Level of Risk/Frequency of Follow-up: low     Assessment and Plan    Nutrition Problem  obesity    Related to (etiology):   Excessive oral intake    Signs and Symptoms (as evidenced by):   BMI of 39     Interventions (treatment strategy):  Collaboration of care with other providers  Modified diet- DM/cardiac diet      Nutrition Diagnosis Status:   New    Monitor and Evaluation    Food and Nutrient Intake: energy intake, food and beverage intake  Food and Nutrient Adminstration: diet order  Knowledge/Beliefs/Attitudes: food and nutrition knowledge/skill  Anthropometric Measurements: weight, weight change  Biochemical Data, Medical Tests and Procedures: electrolyte and renal panel, gastrointestinal profile, glucose/endocrine profile, inflammatory profile, lipid profile  Nutrition-Focused Physical Findings: overall appearance     Nutrition Follow-Up    RD Follow-up?: Yes   Yes